# Patient Record
Sex: FEMALE | Race: WHITE
[De-identification: names, ages, dates, MRNs, and addresses within clinical notes are randomized per-mention and may not be internally consistent; named-entity substitution may affect disease eponyms.]

---

## 2018-11-08 ENCOUNTER — HOSPITAL ENCOUNTER (INPATIENT)
Dept: HOSPITAL 88 - ER | Age: 63
LOS: 6 days | Discharge: HOME | DRG: 872 | End: 2018-11-14
Attending: INTERNAL MEDICINE | Admitting: INTERNAL MEDICINE
Payer: COMMERCIAL

## 2018-11-08 VITALS — DIASTOLIC BLOOD PRESSURE: 71 MMHG | SYSTOLIC BLOOD PRESSURE: 158 MMHG

## 2018-11-08 VITALS — SYSTOLIC BLOOD PRESSURE: 158 MMHG | DIASTOLIC BLOOD PRESSURE: 71 MMHG

## 2018-11-08 VITALS — WEIGHT: 262.06 LBS | BODY MASS INDEX: 42.12 KG/M2 | HEIGHT: 66 IN

## 2018-11-08 DIAGNOSIS — B96.1: ICD-10-CM

## 2018-11-08 DIAGNOSIS — F32.9: ICD-10-CM

## 2018-11-08 DIAGNOSIS — I10: ICD-10-CM

## 2018-11-08 DIAGNOSIS — A41.89: Primary | ICD-10-CM

## 2018-11-08 DIAGNOSIS — N39.0: ICD-10-CM

## 2018-11-08 DIAGNOSIS — A04.72: ICD-10-CM

## 2018-11-08 DIAGNOSIS — B96.89: ICD-10-CM

## 2018-11-08 DIAGNOSIS — E87.6: ICD-10-CM

## 2018-11-08 DIAGNOSIS — E11.9: ICD-10-CM

## 2018-11-08 DIAGNOSIS — F41.9: ICD-10-CM

## 2018-11-08 DIAGNOSIS — R65.20: ICD-10-CM

## 2018-11-08 LAB
ALBUMIN SERPL-MCNC: 2.7 G/DL (ref 3.5–5)
ALBUMIN/GLOB SERPL: 0.8 {RATIO} (ref 0.8–2)
ALP SERPL-CCNC: 64 IU/L (ref 40–150)
ALT SERPL-CCNC: 19 IU/L (ref 0–55)
AMYLASE SERPL-CCNC: 16 U/L (ref 25–125)
ANION GAP SERPL CALC-SCNC: 13.9 MMOL/L (ref 8–16)
BASOPHILS # BLD AUTO: 0.1 10*3/UL (ref 0–0.1)
BASOPHILS NFR BLD AUTO: 0.5 % (ref 0–1)
BUN SERPL-MCNC: 6 MG/DL (ref 7–26)
BUN/CREAT SERPL: 7 (ref 6–25)
C DIFFICILE TOXIN A&B AMP PROB: (no result)
CALCIUM SERPL-MCNC: 8.9 MG/DL (ref 8.4–10.2)
CHLORIDE SERPL-SCNC: 95 MMOL/L (ref 98–107)
CO2 SERPL-SCNC: 27 MMOL/L (ref 22–29)
DEPRECATED NEUTROPHILS # BLD AUTO: 10.2 10*3/UL (ref 2.1–6.9)
EGFRCR SERPLBLD CKD-EPI 2021: > 60 ML/MIN (ref 60–?)
EOSINOPHIL # BLD AUTO: 0.1 10*3/UL (ref 0–0.4)
EOSINOPHIL NFR BLD AUTO: 0.8 % (ref 0–6)
ERYTHROCYTE [DISTWIDTH] IN CORD BLOOD: 11.5 % (ref 11.7–14.4)
GLOBULIN PLAS-MCNC: 3.2 G/DL (ref 2.3–3.5)
GLUCOSE SERPLBLD-MCNC: 107 MG/DL (ref 74–118)
HCT VFR BLD AUTO: 36.9 % (ref 34.2–44.1)
HEMOCCULT STL QL: POSITIVE
HGB BLD-MCNC: 12.6 G/DL (ref 12–16)
LIPASE SERPL-CCNC: 8 U/L (ref 8–78)
LYMPHOCYTES # BLD: 1.2 10*3/UL (ref 1–3.2)
LYMPHOCYTES NFR BLD AUTO: 9.6 % (ref 18–39.1)
LYMPHOCYTES NFR BLD MANUAL: 12 % (ref 19–48)
MCH RBC QN AUTO: 32.4 PG (ref 28–32)
MCHC RBC AUTO-ENTMCNC: 34.1 G/DL (ref 31–35)
MCV RBC AUTO: 94.9 FL (ref 81–99)
MONOCYTES # BLD AUTO: 1.2 10*3/UL (ref 0.2–0.8)
MONOCYTES NFR BLD AUTO: 9.4 % (ref 4.4–11.3)
MONOCYTES NFR BLD MANUAL: 5 % (ref 3.4–9)
NEUTS BAND NFR BLD MANUAL: 5 %
NEUTS SEG NFR BLD AUTO: 79.3 % (ref 38.7–80)
NEUTS SEG NFR BLD MANUAL: 77 % (ref 40–74)
PLAT MORPH BLD: NORMAL
PLATELET # BLD AUTO: 289 X10E3/UL (ref 140–360)
PLATELET # BLD EST: ADEQUATE 10*3/UL
POTASSIUM SERPL-SCNC: 2.9 MMOL/L (ref 3.5–5.1)
RBC # BLD AUTO: 3.89 X10E6/UL (ref 3.6–5.1)
RBC MORPH BLD: NORMAL
SODIUM SERPL-SCNC: 133 MMOL/L (ref 136–145)

## 2018-11-08 PROCEDURE — 99284 EMERGENCY DEPT VISIT MOD MDM: CPT

## 2018-11-08 PROCEDURE — 87493 C DIFF AMPLIFIED PROBE: CPT

## 2018-11-08 PROCEDURE — 80053 COMPREHEN METABOLIC PANEL: CPT

## 2018-11-08 PROCEDURE — 82270 OCCULT BLOOD FECES: CPT

## 2018-11-08 PROCEDURE — 87086 URINE CULTURE/COLONY COUNT: CPT

## 2018-11-08 PROCEDURE — 87186 SC STD MICRODIL/AGAR DIL: CPT

## 2018-11-08 PROCEDURE — 87045 FECES CULTURE AEROBIC BACT: CPT

## 2018-11-08 PROCEDURE — 82150 ASSAY OF AMYLASE: CPT

## 2018-11-08 PROCEDURE — 85025 COMPLETE CBC W/AUTO DIFF WBC: CPT

## 2018-11-08 PROCEDURE — 83690 ASSAY OF LIPASE: CPT

## 2018-11-08 PROCEDURE — 81001 URINALYSIS AUTO W/SCOPE: CPT

## 2018-11-08 PROCEDURE — 36415 COLL VENOUS BLD VENIPUNCTURE: CPT

## 2018-11-08 PROCEDURE — 80048 BASIC METABOLIC PNL TOTAL CA: CPT

## 2018-11-08 PROCEDURE — 74177 CT ABD & PELVIS W/CONTRAST: CPT

## 2018-11-08 RX ADMIN — VANCOMYCIN HYDROCHLORIDE SCH MG: 250 CAPSULE ORAL at 17:56

## 2018-11-08 RX ADMIN — POTASSIUM CHLORIDE, DEXTROSE MONOHYDRATE AND SODIUM CHLORIDE SCH MLS/HR: 150; 5; 450 INJECTION, SOLUTION INTRAVENOUS at 17:56

## 2018-11-08 RX ADMIN — CITALOPRAM HYDROBROMIDE SCH MG: 20 TABLET, FILM COATED ORAL at 21:23

## 2018-11-08 RX ADMIN — METRONIDAZOLE SCH MG: 500 INJECTION, SOLUTION INTRAVENOUS at 17:37

## 2018-11-08 NOTE — XMS REPORT
Clinical Summary

                             Created on: 2018



Reta Vallejo

External Reference #: [id number]

: 1955

Sex: Female



Demographics







                          Address                   [phone]

[place]  [address]

 

                          Home Phone                [phone]

 

                          Preferred Language        English

 

                          Marital Status            

 

                          Adventist Affiliation     [id number]

 

                          Race                      White

 

                          Ethnic Group              Non-





Author







                          Author                    [facility]

 

                          Organization              [facility]

 

                          Address                   Unknown

 

                          Phone                     Unavailable







Support







                Name            Relationship    Address         Phone

 

                Huseyin Vallejo    [employer]            Unknown         [phone]







Care Team Providers







                    Care Team Member Name    Role                Phone

 

                    Beverly Fuentes DO    PCP                 [phone]







Allergies

Not on File



Current Medications

Not on file



Active Problems





Not on file



Encounters







    



              Date         Type         Specialty     Care Team     Description

 

    



              2018     Hospital     Radiology     Beverly Fuentes,      Transient arthropathy,





                           Encounter                 pelvic region and thigh,



                                         right

 

    



              2018     [facility]     Radiology     Beverly Fuentes DO     Low back pain,



                           Encounter                 unspecified back pain



                                         laterality, unspecified



                                         chronicity, with sciatica



                                         presence unspecified

 

    



              2018     [facility]     Radiology     Beverly Fuentes DO     Postmenopausal status





                           Encounter                 (age-related) (natural)

 

    



              2018     [facility]     Radiology     Beverly Fuentes DO     Low back pain,





                           Orders                    unspecified back pain



                                         laterality, unspecified



                                         chronicity, with sciatica



                                         presence unspecified



                                         (Primary Dx);



                                         Transient arthropathy,



                                         pelvic region and thigh,



                                         right

 

    



              2018     [facility]     Access       Beverly Fuentes DO     Postmenopausal status





                           Orders                    (age-related) (natural)



                                         (Primary Dx)

 

    



              2018     [facility]     Radiology     Beverly Fuentes DO     Screening breast





                           Encounter                 examination

 

    



              2017     [facility]     Access       Beverly Fuentes DO     Screening breast



                           Orders                    examination (Primary Dx)



after 2017



Social History







    



              Tobacco Use     Types        Packs/Day     Years Used     Date

 

    



                                         Never Assessed    









 



                           Sex Assigned at Birth     Date Recorded

 

 



                                         Not on file 







Last Filed Vital Signs

Not on file



Plan of Treatment







   



                 Health Maintenance     Due Date        Last Done       Comments

 

   



                           CERVICAL CANCER SCREENING     1974  

 

   



                           COLON CANCER SCREENING     2003  

 

   



                           SHINGRIX VACCINE (#1)     2003  

 

   



                           ZOSTER VACCINE            2013  

 

   



                           PNEUMOCOCCAL              2018  



                                         POLYSACCHARIDE VACCINE   



                                         AGE 65 AND OVER   

 

   



                           PNEUMOCOCCAL-13           2018  

 

   



                           INFLUENZA VACCINE         2018  

 

   



                     BREAST CANCER SCREENING     2020 







Procedures







    



              Procedure Name     Priority     Date/Time     Associated Diagnosis     Comments

 

    



              XR HIP 4 VIEWS RIGHT     Routine      2018     Transient arthropathy,     Results 

for this



                     12:26 PM CDT        pelvic region and thigh,     procedure are in the



                           right                     results section.

 

    



              XR LUMBAR SPINE COMPLETE     Routine      2018     Low back pain,     Results for 

this



                 4+ VW           12:25 PM CDT     unspecified back pain     procedure are in the



                           laterality, unspecified     results section.



                                         chronicity, with sciatica 



                                         presence unspecified 

 

    



              BONE DENSITY     Routine      2018     Postmenopausal status     Results for this



                     11:42 AM CDT        (age-related) (natural)     procedure are in the



                                         results section.

 

    



              MAMMO SCREENING W CAD     Routine      2018     Screening breast     Results for this





                 BILATERAL       10:44 AM CST     examination     procedure are in the



                                         results section.



after 2017



Results

* XR Hip 4 Views Right (2018 12:26 PM)





 



                           Narrative                 Performed At

 

 



                           Title:Right hip            [facility]



                                         Reason for exam:M12.851 Other specific arthropathiesnot elsewhere 



                                         classifiedright hip, ARTHRITISHIP 



                                          



                                         Comparison studies: 



                                         CT right hip 



                                         Impression: 



                                         There is a total right hip prosthesis in good alignment. There is no breakage or

 



                                         displacement of the hardware. 



                                         The sacroiliac joints are unremarkable. The iliac, pubic and ischial bones are 





                                         unremarkable. The left hip is incidentally unremarkable. 



                                         [id number] 









                                        Procedure Note

 

                                        



Hm Interface, Radiology Results Incoming - 2018  2:14 PM CDT



Title:Right hip



Reason for exam:M12.851 Other specific arthropathies  not elsewhere classified  
right hip, ARTHRITIS  HIP

   



Comparison studies:



CT right hip



Impression:



There is a total right hip prosthesis in good alignment. There is no breakage or
displacement of the hardware.



The sacroiliac joints are unremarkable. The iliac, pubic and ischial bones are 
unremarkable. The left hip is incidentally unremarkable.







[id number]











   



                 Performing Organization     Address         City/State/Zipcode     Phone Number

 

   



                      [facility]          [address] [place] [address] 





* XR Lumbar Spine Complete 4+ Vw (2018 12:25 PM)





 



                           Narrative                 Performed At

 

 



                           EXAMINATION:XR LUMBAR SPINE COMPLETE 4VW     [facility]



                                         CLINICAL HISTORY:M54.5 Low back pain, LOW BACK PAINUNCOMPLICATEDNO 





                                         RED FLAG SIGNS SYMPTOMS HISTORY 



                                         COMPARISON:None. 



                                         IMPRESSION: 



                                         There is mild congenital lumbar spinal canal stenosis with decrease in the 



                                         transverse diameter of the spinal canal. 



                                         There is mild diffuse disc space narrowing throughout the lumbar spine. 



                                         There is grade 1 degenerative anterolisthesis at L4-5 with prominent underlying

 



                                         facet joint degenerative changes especially on the right. 



                                         There is mild levo rotoscoliosis. 



                                         There is no evidence of compression fracture. 



                                         [facility]-[id number] 









                                        Procedure Note

 

                                        



Hm Interface, Radiology Results Incoming - 2018  2:26 PM CDT



EXAMINATION:  XR LUMBAR SPINE COMPLETE 4  VW



CLINICAL HISTORY:  M54.5 Low back pain, LOW BACK PAIN  UNCOMPLICATED  NO RED 
FLAG SIGNS SYMPTOMS HISTORY



COMPARISON:  None.





IMPRESSION:

 



There is mild congenital lumbar spinal canal stenosis with decrease in the 
transverse diameter of the spinal canal.



There is mild diffuse disc space narrowing throughout the lumbar spine.



There is grade 1 degenerative anterolisthesis at L4-5 with prominent underlying 
facet joint degenerative changes especially on the right.



There is mild levo rotoscoliosis.



There is no evidence of compression fracture.



 



 





[facility]-[id number]











   



                 Performing Organization     Address         City/State/Zipcode     Phone Number

 

   



                     [facility]          [address] [place] [address] 





* Bone Density (2018 11:42 AM)





 



                           Narrative                 Performed At

 

 



                           EXAMINATION:BONE DENSITY      [facility]



                                         CLINICAL HISTORY:Z78.0 Asymptomatic menopausal state, postmenopausal status

 



                                         COMPARISON:None. 



                                         The results of this study expressed as bone mineral density (BMD) were as 



                                         follows: 



                                         AP spine (L1-L4) 



                                         BMD: 1.45 g/cm2 



                                         T-Score: 2.1 



                                         Percent change from previous:NA 



                                         Dual Femur (Total Mean): 



                                         BMD: 0.92 g/cm2 



                                         T-Score: -0.7 



                                         Percent change from previous:NA 



                                         Forearm (Radius 33%): 



                                         BMD: NA g/cm2 



                                         T-Score: NA 



                                         Percent change from previous:NA 



                                         Dual femur FRAX: 



                                         Risk factors: Current tobacco use 



                                         10 year probability of fracture: 



                                         1.Major osteoporotic: 7.9% 



                                         2.Hip: 1.3% 



                                         Impression: 



                                         Bone mineral density values as above. No osteoporosis or osteopenia. 



                                         A copy of this scans including a report detailing these results will follow. 



                                         Note: The world health organization (WHO) has classified the patient's T-score 





                                         as follows: 



                                         Normal:T score at or above -1.0 



                                         Osteopenia:T score between -1.0 and -2.5 



                                         Osteoporosis:T score at or below -2.5 (osteoporosis, increased fracture 



                                         risk) 



                                         [facility]-[id number] 









                                        Procedure Note

 

                                        



 Interface, Radiology Results Incoming - 2018  3:41 PM CDT



EXAMINATION:  BONE DENSITY



CLINICAL HISTORY:  Z78.0 Asymptomatic menopausal state, postmenopausal status



COMPARISON:  None.



The results of this study expressed as bone mineral density (BMD) were as 
follows:



AP spine (L1-L4)

BMD: 1.45 g/cm2

T-Score: 2.1

Percent change from previous:  NA 





Dual Femur (Total Mean):

BMD: 0.92 g/cm2

T-Score: -0.7

Percent change from previous:    NA





Forearm (Radius 33%):

BMD: NA g/cm2

T-Score: NA

Percent change from previous:  NA





Dual femur FRAX:

Risk factors: Current tobacco use

                                        10 year probability of fracture:

                                        1.  Major osteoporotic: 7.9%

                                        2.  Hip: 1.3%







Impression:  

Bone mineral density values as above. No osteoporosis or osteopenia.







A copy of this scans including a report detailing these results will follow.



Note: The world health organization (WHO) has classified the patient's T-score 
as follows:



Normal:  T score at or above -1.0

Osteopenia:  T score between -1.0 and -2.5

Osteoporosis:  T score at or below -2.5 (osteoporosis, increased fracture risk)



[facility]-[id number]











   



                 Performing Organization     Address         City/State/Zipcode     Phone Number

 

   



                      [facility]          [address] [place] [address] 





* Mammo Screening w Cad Bilateral (2018 10:44 AM)





 



                           Narrative                 Performed At

 

 



                           PROCEDURE: MAMMO SCREENING W CAD BILATERAL      [facility]



                                         Computer-assisted detection was utilized inthe interpretation of this exam.

 



                                         COMPARISON: No prior outside facility mammograms from [facility] 



                                         TECHNIQUE: 



                                          



                                         Bilateral digital screening mammogram was performed and interpreted using 



                                         computer-assisted detection. 



                                         HISTORY: Asymptomatic routine screening. 



                                         Family History: No known family history. 



                                         FINDINGS: 



                                         BreastComposition: There are scattered areas of fibroglandular density ( 



                                         category B). 



                                         No suspicious mass , architectural distortion or suspicious microcalcifications

 



                                         are present. 



                                         There are scattered bilateral benign morphology breast calcifications. 



                                         IMPRESSION: No mammographic evidence of malignancy. 



                                         Birads Category 2. Benign. 



                                         RECOMMENDATIONS:If the clinical breast examination is unchanged and normal 

, 



                                         annual screening mammography is recommended per ACS and ACR guidelines. 



                                         PATIENT INFORMATION HAS BEEN ENTERED INTO A REMINDER SYSTEM WITH TARGET DUE DATE

 



                                         FOR THE NEXT MAMMOGRAM. 



                                         NOTE: 



                                         This facility is a designated [facility] ( [facility]) 

, 



                                         meeting standards of accreditation in all modalities of breast imaging. 



                                         This facility is accredited by The American College of Radiology for 



                                         Mammography. 



                                         A negative x-ray report should not delay biopsy if a dominant or clinically 



                                         suspicious mass is present. Not all cancers are identified by x-ray. 



                                         [id number] 









   



                 Performing Organization     Address         City/State/Zipcode     Phone Number

 

   



                      [facility]          [address] [place] [address] 





after 2017



Insurance







     



            Payer      Benefit     Subscriber ID     Type       Phone      Address



                                         Plan /    



                                         Group    

 

     



                 BCBS            BCBS            xxxxxxxxxxxx     PPO  



                                         CHOICE    



                                         PPO/ROHINI HINES PPO    









     



            Guarantor Name     Account     Relation to     Date of     Phone      Billing Address



                     Type                Patient             Birth  

 

     



            RETA VALLEJO     Personal/F     Self       1955     Home:      [address]



                     amily               [phone]     [place] [address]

## 2018-11-08 NOTE — XMS REPORT
Patient Summary Document

                             Created on: 2018



RETA SPARKS

External Reference #: [id number]

: 1955

Sex: Female



Demographics







                          Address                   [address]

[place]  [address]

 

                          Home Phone                [phone]

 

                          Preferred Language        Unknown

 

                          Marital Status            Unknown

 

                          Protestant Affiliation     Unknown

 

                          Race                      Unknown

 

                                        Additional Race(s)  

 

                          Ethnic Group              Unknown





Author







                          Author                    [facility]

 

                          Address                   Unknown

 

                          Phone                     Unavailable







Care Team Providers







                    Care Team Member Name    Role                Phone

 

                    MIRNA JOSEPH    Unavailable         Unavailable







Problems

This patient has no known problems.



Allergies, Adverse Reactions, Alerts

This patient has no known allergies or adverse reactions.



Medications

This patient has no known medications.



Results







           Test Description    Test Time    Test Comments    Text Results    Atomic Results    Result

 Comments

 

                CT ABDOMEN/PELVIS W    2018 15:19:00                                                       

                                                   [place] [address]      Patient Name: RETA SPARKS                                   MR
#: [id number]                     : 1955                               
   Age/Sex: 63/F  Acct #: [id number]                              Req #: [id number]
[id number]  Adm Physician:                                                      
Ordered by: FRANCIS JOSEPH MD                            Report #: [id number]  
     Location: ER                                      Room/Bed:                
    
___________________________________________________________________________________________________
   Procedure: [id number] CT/CT ABDOMEN/PELVIS W  Exam Date: 18             
              Exam Time: 1430                                              
REPORT STATUS: Signed    EXAM: CT Abdomen and Pelvis WITH contrast     INDICATIO
N:          low abd pain profuse diarrhea    2018    143    N          
COMPARISON: None.   TECHNIQUE: Abdomen and pelvis were scanned utilizing a 
multidetector helical   scanner from the lung base to the pubic symphysis after 
administration of IV   contrast. Coronal and sagittal reformations were 
obtained. Routine protocol was   performed. Scan was performed when during 
portal venous phase.               IV CONTRAST: 100 mL of Isovue-370            
  ORAL CONTRAST: Water               RADIATION DOSE: Total DLP: 850.7 mGy*cm    
           Estimated effective dose: (DLP x 0.015 x size factor) mSv            
  COMPLICATIONS: None      FINDINGS:      LINES and TUBES: None.      LOWER 
THORAX:  Small to moderate hiatal hernia.      HEPATOBILIARY: Hepatomegaly. The 
liver measures 24 cm in length in the   craniocaudal dimension.  No focal 
hepatic lesions. No biliary ductal dilation.       GALLBLADDER: No radio-opaque 
stones or sludge.  No wall thickening.      SPLEEN: No splenomegaly.       PANC
REAS: No focal masses or ductal dilatation.        ADRENALS: 1.5 cm 
indeterminate left adrenal gland nodule on series 2, image 23.   The right 
adrenal gland is normal.          KIDNEYS/URETERS: Kidneys enhance 
symmetrically.  No hydronephrosis. No cystic   or solid mass lesions.  No 
stones.      GI TRACT: Diffuse wall thickening of the sigmoid colon and rectum 
with   surrounding fat stranding and hyperemia without diverticulosis. The 
remaining   bowel is unremarkable.       Appendix is normal.      PELVIC 
ORGANS/BLADDER: Unremarkable.      LYMPH NODES: No lymphadenopathy.      
VESSELS: The abdominal aorta and pelvic arteries are normal in caliber and   
associated with mild atherosclerotic calcifications. Bilateral renal stents.   
Patency cannot be evaluated on this exam. Celiac trunk and SMA are patent with  
associated mild nonobstructing atherosclerotic calcifications.      PERITONEUM /
RETROPERITONEUM: No free air or fluid.      BONES: Right hip replacement.      
SOFT TISSUES: Calcified granulomata in the soft tissues of the right thigh   
likely related to prior injection.                     IMPRESSION:    1. Acute 
inflammatory versus infectious proctocolitis. Recommend follow-up CT   abdomen 
and pelvis after treatment is completed.      - No free air or free fluid in the
abdomen and pelvis.      - No diverticulosis.      2. Indeterminate left adrenal
gland nodule (1.5 cm). Recommend ambulatory CT or   MRI abdomen with and without
contrast arteriogram protocol for further   evaluation.      Signed by: Dr. Stephanie Mueller M.D. on 2018 3:36 PM        Dictated By: STEPHANIE MUELLER MD  Electronically Signed By: STEPHANIE MUELLER MD on 
18 1536  Transcribed By: KEYSHAWN on 18 1536       COPY TO:   
FRANCIS JOSEPH MD

## 2018-11-08 NOTE — DIAGNOSTIC IMAGING REPORT
EXAM: CT Abdomen and Pelvis WITH contrast  

INDICATION:      

^low abd pain profuse diarrhea

^[id number]

^1430

^N    



COMPARISON: None.

TECHNIQUE: Abdomen and pelvis were scanned utilizing a multidetector helical

scanner from the lung base to the pubic symphysis after administration of IV

contrast. Coronal and sagittal reformations were obtained. Routine protocol was

performed. Scan was performed when during portal venous phase.

            IV CONTRAST: 100 mL of Isovue-370

            ORAL CONTRAST: Water

            RADIATION DOSE: Total DLP: 850.7 mGy*cm

             Estimated effective dose: (DLP x 0.015 x size factor) mSv

            COMPLICATIONS: None



FINDINGS:



LINES and TUBES: None.



LOWER THORAX:  Small to moderate hiatal hernia.



HEPATOBILIARY: Hepatomegaly. The liver measures 24 cm in length in the

craniocaudal dimension.  No focal hepatic lesions. No biliary ductal dilation. 



GALLBLADDER: No radio-opaque stones or sludge.  No wall thickening.



SPLEEN: No splenomegaly. 



PANCREAS: No focal masses or ductal dilatation.  



ADRENALS: 1.5 cm indeterminate left adrenal gland nodule on series 2, image 23.

The right adrenal gland is normal.    



KIDNEYS/URETERS: Kidneys enhance symmetrically.  No hydronephrosis. No cystic

or solid mass lesions.  No stones.



GI TRACT: Diffuse wall thickening of the sigmoid colon and rectum with

surrounding fat stranding and hyperemia without diverticulosis. The remaining

bowel is unremarkable.       Appendix is normal.



PELVIC ORGANS/BLADDER: Unremarkable.



LYMPH NODES: No lymphadenopathy.



VESSELS: The abdominal aorta and pelvic arteries are normal in caliber and

associated with mild atherosclerotic calcifications. Bilateral renal stents.

Patency cannot be evaluated on this exam. Celiac trunk and SMA are patent with

associated mild nonobstructing atherosclerotic calcifications.



PERITONEUM / RETROPERITONEUM: No free air or fluid.



BONES: Right hip replacement.



SOFT TISSUES: Calcified granulomata in the soft tissues of the right thigh

likely related to prior injection.            





IMPRESSION: 

1. Acute inflammatory versus infectious proctocolitis. Recommend follow-up CT

abdomen and pelvis after treatment is completed.



- No free air or free fluid in the abdomen and pelvis.



- No diverticulosis.



2. Indeterminate left adrenal gland nodule (1.5 cm). Recommend ambulatory CT or

MRI abdomen with and without contrast arteriogram protocol for further

evaluation.



Signed by: Dr. Olga Garnica M.D. on 11/8/2018 3:36 PM

## 2018-11-08 NOTE — XMS REPORT
[facility] Summary

                             Created on: 2018



Ramón Vallejo

External Reference #: [id number]

: 1955

Sex: Female



Demographics







                          Address                   [address]

[place]  [address]

 

                          Home Phone                [email or web address]

 

                          Preferred Language        English

 

                          Marital Status            M

 

                          Synagogue Affiliation     Unknown

 

                          Race                      Unknown

 

                          Ethnic Group              Non-





Author







                          Author                    Admin, [place]

 

                          Organization              [facility]

 

                          Address                   Unknown

 

                          Phone                     Unavailable







Allergies, Adverse Reactions, Alerts







           Allergy Name    Reaction Description    Start Date    Severity    Status     Provider

 

           No Known Allergies                                         Evy Zepeda CMA







Conditions or Problems







        Problem Name    Problem Code    Onset Date    Status    Entry Date    Provider    Comment    Standard

 Description                            Annotate

 

           Hidradenitis suppurativa    705.83         Active         Beverly Omar Pariani

 DO                                     Hidradenitis         

 

           Hx of colon polyps    V12.72         Active         Beverly Omar Pariani 

DO                                      Personal history of colonic polyps    gets colonoscopy q 2 yrs per ptn

 

           Screening for ovarian cancer    V76.46         Active         Beverly Omar

 Pariani DO                             Screening for malignant neoplasms of the ovary     

 

             Screening Pap smear exam for cervical cancer    V76.2            Active       

                Beverly Omar Pariani DO                    Screening for malignant neoplasms of the cervix     

 

          Sebaceous cyst    706.2         Active        Beverly Omar Pariani DO     

                          Sebaceous cyst             

 

          Aortic stenosis    424.1         Active        Beverly Omar Pariani DO    

                          Aortic valve disorders    9/15 Echo: mod AS (SULMA 1.1 cm)

Cards: Dr. Ortega

 

                Asymptomatic postmenopausal status (age-related) (natural)    V49.81                

Active                Beverly Omar Fierroani DO                    Asymptomatic postmenopausal status

 (age-related) (natural)                 

 

        CAD     414.00        Active        Beverly Omar Sriani DO            Coronary

 atherosclerosis of unspecified type of vessel, native or graft    Cards: Dr. Ortega



1/10 CathL 60-70 % dominant LCX stenosis

rec: med mgnt

 

           Diastolic dysfunction    429.9          Active         Beverlyjuanis Fuentes

 DO                                     Heart disease, unspecified     Echo: Grade 2 diastolic dysfn

 

           Hip joint pain, left    719.45         Active         Beverly Fuentes

 DO                                     Pain in joint involving pelvic region and thigh    s/p R hip joint replacement



Ortho: Dr. Figueroa (old)

Ortho: Dr. Ray (new)

 

           Incontinence, mixed, urge/stress    788.33         Active         Beverly Fuentes DO                             Mixed incontinence (female) (male)    wears pads at night

Uro: Dr. Chaudhary

 

          Lower back pain    724.2         Active        Beverly Fuentes DO    

                          Lumbago                   3/18 xray: grade 1 degen anterolisthesis @ L5-L5 w/ promiment underlying

 facet joint degen changes on the R

 

        Obesity                Active        Beverly Nelson Sriester DO            Obesity, 

unspecified                              

 

        PVC     427.69        Active        Beverly Fuentes DO            Other premature

 beats                                  Cards: Dr. Ortega

 

        Anxiety    300.00        Active        Beverly Fuentes             Anxiety

 state, unspecified                     Psychiatry: Dr. Galindo Erwin

3/26/18 GAD7=4 (tx: Citalopram 80 mg, Clonazepam 2mg TID)

 

           Arthritis, right hip    716.95         Active         Beverly Fuentes

                                      Arthropathy unspecified, involving pelvic region and thigh    s/p R hip replacment



regular water aerobics

 

        Depression    311         Active        Beverly Fuentes             Depressive

 disorder, not elsewhere classified     Psychiatry: Dr. Galindo Erwin

3/2018 PHQ 9=5 (tx: Citalopram 80 mg, Clonazepam 2mg TID)

 

          Frequent falls    781.2         Active        Beverly Omar Alfredo MCKENZIE     

                          Abnormality of gait       2/2 R hip pain

 

        Hx of HPV    079.4        Active        Beverly Omar uFentes DO            Human

 papillomavirus infection in conditions classified elsewhere and of unspecified 
site                                    enrolled in a study at [facility]

OB/GYN: Dr. Vicki Pryor

 

           Hx of iron deficiency anemia    V12.3          Active         Beverly Fuentes DO                             Personal history of diseases of blood and blood-forming organs     



 

           Hypercholesterolemia    272.0          Active         Beverly Fuentes

 DO                                     Pure hypercholesterolemia     Low HDL

 

        Hypertension    401.9        Active        Beverly Fuentes DO            

Unspecified essential hypertension      Cardio: Dr. Kaiden Ortega

 

           Preventive health care    V70.0          Active         Beverly Fuentes

 DO                                     Routine general medical examination at a health care facility    refuses vaccines



 

           Screening mammogram for breast cancer    V76.12         Active         Beverly Fuentes DO                        Other screening mammogram     

 

        Tobacco user    305.1        Active        Beverly Fuentes DO            

Tobacco use disorder                    1-1.5 pack/day >30yrs

 

           Stress incontinence    788.39         Inactive        Beverly Fuentes

 DO                                     Other urinary incontinence    wears pads

 

             Female urinary stress incontinence    ICD-625.6        Inactive 

                    Beverly Omar Pariani DO               

 

           Total hip arthroplasty, right    ICD-V43.64        Inactive    Beverly Fierroani DO                         

 

           Female urinary stress incontinence    625.6          Resolved        Beverly Fuentes DO                        Stress incontinence, female    wears pads

 

           Total hip arthroplasty, right    V43.64         Resolved        Beverly Fuentes DO                             Hip joint replaced by other means    Ortho: Dr. Figueroa







Medication List







        Medication    Instructions    Start Date    Stop Date    Generic Name    NDC     Status    Provider

                                        Patient Instruction

 

                          MYRBETRIQ 50 MG ORAL TABLET EXTENDED RELEASE 24 HOUR    take 1 tab By Mouth Every 

at bedtime                   MIRABEGRON    10805286314    Active     Beverly Fuentes DO

                                                    Active

 

                    CENTRUM SILVER 50+WOMEN ORAL TABLET    Take one tab By Mouth Every Day    

                    MULTIPLE VITAMINS-MINERALS    [id number]    Active    Beverly Omar Fuentes DO              

Active

 

             COQ10 200 MG ORAL CAPSULE    Take one capsule By Mouth Every Day                     COENZYME

 Q10         61905920306    Active       Beverly Fuentes DO                 Active

 

                          ALEVE 220 MG ORAL TABLET    Take 2 tabs By Mouth Every 12 hrs As Needed for pain. 

Do not exceed 3 tabs in 24 hrs.                     NAPROXEN SODIUM    56688802528    Active

                    Beverly Omar Fuentes DO                        Active

 

             ASPIRIN 81 MG ORAL TABLET    Take one tab By Mouth Every Day                     ASPIRIN

             [id number]    Active       Beverly Omar Fuentes DO                 Active

 

                          CALCIUM 500-125 MG-UNIT ORAL TABLET    Take one tab By Mouth Every Day for bone health

                          CALCIUM CARBONATE-VITAMIN D    10584806923    Active     Beverly Omar Alfredo

 DO                                                 Active

 

                          FERROUS SULFATE 325 (65 FE) MG ORAL TABLET    Take one tab By Mouth Every Day for 

iron deficiency                   FERROUS SULFATE    77511774299    Active     Beverly Fierroester DO                                         Active

 

                          GLUCOSAMINE CHONDROITIN JOINT ORAL TABLET    Take one tab By Mouth Every Day for your

 joints (hip)                     GLUCOS-CHONDROIT-HYALURON-MSM    25144132637    Active 

                    Beverly Fierroester DO                        Active

 

                    VITAMIN B-12 500 MCG ORAL TABLET    Take one tab By Mouth Every Day for energy    

                    CYANOCOBALAMIN    79679264255    Active    Beverly Fierroester DO              Active

 

                    CITALOPRAM HYDROBROMIDE 40 MG ORAL TABLET    TK 2 TS PO QHS for depression    

                    CITALOPRAM HYDROBROMIDE    37138829366    Active    Beverly Fierroester DO              Active



 

             CLONAZEPAM 2 MG ORAL TABLET    TK 1/2 TO 1 T PO TID PRF ANXIETY                     CLONAZEPAM

             13260807037    Active       Beverly Fierroester DO                 Active

 

                    RAMIPRIL 10 MG ORAL CAPSULE    TK 1 C PO Every Day for blood pressure     

                    RAMIPRIL    68953213298    Active    Beverly Fierroester DO              Active

 

                          METOPROLOL SUCCINATE ER 25 MG ORAL TABLET EXTENDED RELEASE 24 HOUR    Take one tab

 By Mouth Every Day at bedtime for blood pressure                                  METOPROLOL SUCCINATE

             54667056475    Active       Beverly Fierroester DO                 Active

 

                          ATORVASTATIN CALCIUM 10 MG ORAL TABLET    Take one tab By Mouth Every Day at bedtime

 for Cholesterol                   ATORVASTATIN CALCIUM    13446461874    Active     Beverly Fierroester DO                                   Active







Vital Signs







           Date       Name       Value      Unit       Range      Description

 

               blood pressure, diastolic    77         mm[Hg]                BP zarate

 

               blood pressure, systolic    138        mm[Hg]                BP sys

 

               height E&M    65.50      [in_us]               Bdy height

 

               pulse rate E&M    53         /min                  Heart rate

 

               respiratory rate E&M    17         /min                  Resp rate

 

               temperature E&M    98.6       [degF]                Body temperature

 

               weight E&M    240.40     [lb_av]               Weight Measured

 

               blood pressure, diastolic    73         mm[Hg]                BP zarate

 

               blood pressure, systolic    135        mm[Hg]                BP sys

 

               height E&M    65.50      [in_us]               Bdy height

 

               pulse rate E&M    68         /min                  Heart rate

 

               respiratory rate E&M    18         /min                  Resp rate

 

               temperature E&M    98.1       [degF]                Body temperature

 

               weight E&M    237.40     [lb_av]               Weight Measured

 

               blood pressure, diastolic    73         mm[Hg]                BP zarate

 

               blood pressure, systolic    116        mm[Hg]                BP sys

 

               height E&M    65.50      [in_us]               Bdy height

 

               pulse rate E&M    55         /min                  Heart rate

 

               respiratory rate E&M    18         /min                  Resp rate

 

               temperature E&M    98.9       [degF]                Body temperature

 

               weight E&M    233.80     [lb_av]               Weight Measured

 

               blood pressure, diastolic    74         mm[Hg]                BP zarate

 

               blood pressure, systolic    120        mm[Hg]                BP sys

 

               height E&M    65.5       [in_us]               Bdy height

 

               pulse rate E&M    66         /min                  Heart rate

 

               respiratory rate E&M    19         /min                  Resp rate

 

               temperature E&M    98.3       [degF]                Body temperature

 

               weight E&M    234        [lb_av]               Weight Measured







Diagnostic Results







           Date       Name       Value      Unit       Range      Description

 

                                        Office Visit: established - Urinalysis 

 

               nitrite, urine, semiquantitative    negative                           

 

               urobilinogen, urine, semiquantitative (dipstick)    0.2                               

 

               specific gravity, urine    1.010                             

 

               pH, urine, semiquantitative    5.0                               

 

               bilirubin, urine    negative                           

 

                                        Office Visit: Acute Visit - Lab 

 

               Human Papillomavirus test result    Positive                           

 

                                        Office Visit: established - Urinalysis 

 

               leukocyte esterase, urine, by dipstick    negative                           

 

               appearance, urine    clear                             

 

               protein, urine, semiquantitative (dipstick)    negative                           

 

               blood in urine (hemoglobin) by dipstick    negative                           

 

               glucose, urine, semiquantitative    negative                           

 

               urine color    yellow                            

 

               ketones, urine, by test strip    negative                           







Encounters







             Date         Encounter    Provider     Code         Facility

 

                 11:33:40 CDT    Est Patient Detailed - 97242    Beverly Fuentes DO    CPT-[id number]

                                        [facility]

 

                 11:54:41 CDT    Est Patient Detailed - 70491    Beverly Fierroani DO    CPT-[id number]

                                        [facility]

 

                     22:48:29 CST    New Patient Comprehensive - 31889    Beverly Omar Fierroani 

DO                        CPT-[id number]                 [facility]

 

                 22:37:14 CST    Est Patient Detailed - 50888    Beverly Omarjazzy Fierroani DO    CPT-68851

                                        [facility]







Procedures







             Code         Procedure Name    Date         Entry Date    Standard Description

 

                CPT-62101       Est Patient Well Exam (65 & Over) - 65346     20:03:31 CDT    

                                         

 

                CPT-83879       Urinalysis - Dip only - In House     18:21:07 CDT    

                                         

 

                CPT-46064       Urinalysis - Dip only - In House     00:32:23 CDT

## 2018-11-09 VITALS — DIASTOLIC BLOOD PRESSURE: 67 MMHG | SYSTOLIC BLOOD PRESSURE: 125 MMHG

## 2018-11-09 VITALS — DIASTOLIC BLOOD PRESSURE: 59 MMHG | SYSTOLIC BLOOD PRESSURE: 130 MMHG

## 2018-11-09 VITALS — DIASTOLIC BLOOD PRESSURE: 64 MMHG | SYSTOLIC BLOOD PRESSURE: 139 MMHG

## 2018-11-09 VITALS — DIASTOLIC BLOOD PRESSURE: 65 MMHG | SYSTOLIC BLOOD PRESSURE: 139 MMHG

## 2018-11-09 VITALS — DIASTOLIC BLOOD PRESSURE: 75 MMHG | SYSTOLIC BLOOD PRESSURE: 165 MMHG

## 2018-11-09 VITALS — DIASTOLIC BLOOD PRESSURE: 67 MMHG | SYSTOLIC BLOOD PRESSURE: 149 MMHG

## 2018-11-09 VITALS — SYSTOLIC BLOOD PRESSURE: 126 MMHG | DIASTOLIC BLOOD PRESSURE: 57 MMHG

## 2018-11-09 VITALS — DIASTOLIC BLOOD PRESSURE: 57 MMHG | SYSTOLIC BLOOD PRESSURE: 126 MMHG

## 2018-11-09 LAB
ANION GAP SERPL CALC-SCNC: 15.9 MMOL/L (ref 8–16)
ANISOCYTOSIS BLD QL SMEAR: SLIGHT
BACTERIA URNS QL MICRO: (no result) /HPF
BASOPHILS # BLD AUTO: 0.1 10*3/UL (ref 0–0.1)
BASOPHILS NFR BLD AUTO: 0.5 % (ref 0–1)
BILIRUB UR QL: NEGATIVE
BUN SERPL-MCNC: < 5 MG/DL (ref 7–26)
BUN/CREAT SERPL: 7 (ref 6–25)
CALCIUM SERPL-MCNC: 8.7 MG/DL (ref 8.4–10.2)
CHLORIDE SERPL-SCNC: 99 MMOL/L (ref 98–107)
CLARITY UR: CLEAR
CO2 SERPL-SCNC: 22 MMOL/L (ref 22–29)
COLOR UR: YELLOW
DEPRECATED NEUTROPHILS # BLD AUTO: 10.1 10*3/UL (ref 2.1–6.9)
DEPRECATED RBC URNS MANUAL-ACNC: (no result) /HPF (ref 0–5)
EGFRCR SERPLBLD CKD-EPI 2021: > 60 ML/MIN (ref 60–?)
EOSINOPHIL # BLD AUTO: 0.2 10*3/UL (ref 0–0.4)
EOSINOPHIL NFR BLD AUTO: 1.8 % (ref 0–6)
EOSINOPHIL NFR BLD MANUAL: 3 % (ref 0–7)
EPI CELLS URNS QL MICRO: (no result) /LPF
ERYTHROCYTE [DISTWIDTH] IN CORD BLOOD: 11.4 % (ref 11.7–14.4)
GLUCOSE SERPLBLD-MCNC: 155 MG/DL (ref 74–118)
HCT VFR BLD AUTO: 35 % (ref 34.2–44.1)
HGB BLD-MCNC: 12 G/DL (ref 12–16)
HYPOCHROMIA BLD QL SMEAR: SLIGHT
KETONES UR QL STRIP.AUTO: NEGATIVE
LEUKOCYTE ESTERASE UR QL STRIP.AUTO: NEGATIVE
LYMPHOCYTES # BLD: 1 10*3/UL (ref 1–3.2)
LYMPHOCYTES NFR BLD AUTO: 8.2 % (ref 18–39.1)
LYMPHOCYTES NFR BLD MANUAL: 7 % (ref 19–48)
MCH RBC QN AUTO: 31.9 PG (ref 28–32)
MCHC RBC AUTO-ENTMCNC: 34.3 G/DL (ref 31–35)
MCV RBC AUTO: 93.1 FL (ref 81–99)
MONOCYTES # BLD AUTO: 1.3 10*3/UL (ref 0.2–0.8)
MONOCYTES NFR BLD AUTO: 10.2 % (ref 4.4–11.3)
MONOCYTES NFR BLD MANUAL: 8 % (ref 3.4–9)
NEUTS SEG NFR BLD AUTO: 78.8 % (ref 38.7–80)
NEUTS SEG NFR BLD MANUAL: 80 % (ref 40–74)
NITRITE UR QL STRIP.AUTO: NEGATIVE
PLAT MORPH BLD: NORMAL
PLATELET # BLD AUTO: 268 X10E3/UL (ref 140–360)
PLATELET # BLD EST: ADEQUATE 10*3/UL
POTASSIUM SERPL-SCNC: 2.9 MMOL/L (ref 3.5–5.1)
PROT UR QL STRIP.AUTO: NEGATIVE
RBC # BLD AUTO: 3.76 X10E6/UL (ref 3.6–5.1)
RBC MORPH BLD: NORMAL
SODIUM SERPL-SCNC: 134 MMOL/L (ref 136–145)
SP GR UR STRIP: 1.01 (ref 1.01–1.02)
UROBILINOGEN UR STRIP-MCNC: 0.2 MG/DL (ref 0.2–1)
WBC #/AREA URNS HPF: (no result) /HPF (ref 0–5)

## 2018-11-09 RX ADMIN — POTASSIUM CHLORIDE SCH MEQ: 1500 TABLET, EXTENDED RELEASE ORAL at 15:45

## 2018-11-09 RX ADMIN — CLONAZEPAM SCH MG: 1 TABLET ORAL at 20:35

## 2018-11-09 RX ADMIN — VANCOMYCIN HYDROCHLORIDE SCH MG: 250 CAPSULE ORAL at 12:20

## 2018-11-09 RX ADMIN — METRONIDAZOLE SCH MG: 500 INJECTION, SOLUTION INTRAVENOUS at 01:08

## 2018-11-09 RX ADMIN — POTASSIUM CHLORIDE SCH MEQ: 1500 TABLET, EXTENDED RELEASE ORAL at 17:18

## 2018-11-09 RX ADMIN — METRONIDAZOLE SCH MG: 500 INJECTION, SOLUTION INTRAVENOUS at 23:46

## 2018-11-09 RX ADMIN — VANCOMYCIN HYDROCHLORIDE SCH MG: 250 CAPSULE ORAL at 17:34

## 2018-11-09 RX ADMIN — CITALOPRAM HYDROBROMIDE SCH MG: 20 TABLET, FILM COATED ORAL at 08:58

## 2018-11-09 RX ADMIN — METRONIDAZOLE SCH MG: 500 INJECTION, SOLUTION INTRAVENOUS at 05:21

## 2018-11-09 RX ADMIN — POTASSIUM CHLORIDE SCH MEQ: 1500 TABLET, EXTENDED RELEASE ORAL at 12:18

## 2018-11-09 RX ADMIN — VANCOMYCIN HYDROCHLORIDE SCH MG: 250 CAPSULE ORAL at 01:08

## 2018-11-09 RX ADMIN — METRONIDAZOLE SCH MG: 500 INJECTION, SOLUTION INTRAVENOUS at 17:34

## 2018-11-09 RX ADMIN — Medication PRN MG: at 20:47

## 2018-11-09 RX ADMIN — METOPROLOL TARTRATE SCH MG: 25 TABLET, FILM COATED ORAL at 20:35

## 2018-11-09 RX ADMIN — NICOTINE SCH MG: 21 PATCH, EXTENDED RELEASE TRANSDERMAL at 08:58

## 2018-11-09 RX ADMIN — VANCOMYCIN HYDROCHLORIDE SCH MG: 250 CAPSULE ORAL at 05:21

## 2018-11-09 RX ADMIN — POTASSIUM CHLORIDE SCH MEQ: 1500 TABLET, EXTENDED RELEASE ORAL at 16:30

## 2018-11-09 RX ADMIN — METRONIDAZOLE SCH MG: 500 INJECTION, SOLUTION INTRAVENOUS at 12:20

## 2018-11-09 RX ADMIN — CITALOPRAM HYDROBROMIDE SCH MG: 20 TABLET, FILM COATED ORAL at 20:35

## 2018-11-09 RX ADMIN — VANCOMYCIN HYDROCHLORIDE SCH MG: 250 CAPSULE ORAL at 23:46

## 2018-11-09 RX ADMIN — POTASSIUM CHLORIDE, DEXTROSE MONOHYDRATE AND SODIUM CHLORIDE SCH MLS/HR: 150; 5; 450 INJECTION, SOLUTION INTRAVENOUS at 12:39

## 2018-11-09 RX ADMIN — SODIUM CHLORIDE PRN MG: 900 INJECTION INTRAVENOUS at 20:47

## 2018-11-10 VITALS — SYSTOLIC BLOOD PRESSURE: 113 MMHG | DIASTOLIC BLOOD PRESSURE: 56 MMHG

## 2018-11-10 VITALS — DIASTOLIC BLOOD PRESSURE: 51 MMHG | SYSTOLIC BLOOD PRESSURE: 105 MMHG

## 2018-11-10 VITALS — SYSTOLIC BLOOD PRESSURE: 99 MMHG | DIASTOLIC BLOOD PRESSURE: 52 MMHG

## 2018-11-10 VITALS — SYSTOLIC BLOOD PRESSURE: 125 MMHG | DIASTOLIC BLOOD PRESSURE: 59 MMHG

## 2018-11-10 VITALS — SYSTOLIC BLOOD PRESSURE: 110 MMHG | DIASTOLIC BLOOD PRESSURE: 59 MMHG

## 2018-11-10 VITALS — SYSTOLIC BLOOD PRESSURE: 100 MMHG | DIASTOLIC BLOOD PRESSURE: 52 MMHG

## 2018-11-10 LAB
ANION GAP SERPL CALC-SCNC: 13.8 MMOL/L (ref 8–16)
BUN SERPL-MCNC: < 5 MG/DL (ref 7–26)
BUN/CREAT SERPL: 6 (ref 6–25)
CALCIUM SERPL-MCNC: 8.7 MG/DL (ref 8.4–10.2)
CHLORIDE SERPL-SCNC: 105 MMOL/L (ref 98–107)
CO2 SERPL-SCNC: 26 MMOL/L (ref 22–29)
EGFRCR SERPLBLD CKD-EPI 2021: > 60 ML/MIN (ref 60–?)
GLUCOSE SERPLBLD-MCNC: 108 MG/DL (ref 74–118)
POTASSIUM SERPL-SCNC: 3.8 MMOL/L (ref 3.5–5.1)
SODIUM SERPL-SCNC: 141 MMOL/L (ref 136–145)

## 2018-11-10 RX ADMIN — VANCOMYCIN HYDROCHLORIDE SCH MG: 250 CAPSULE ORAL at 11:34

## 2018-11-10 RX ADMIN — ASPIRIN SCH MG: 81 TABLET, COATED ORAL at 20:48

## 2018-11-10 RX ADMIN — Medication SCH MG: at 09:12

## 2018-11-10 RX ADMIN — CITALOPRAM HYDROBROMIDE SCH MG: 20 TABLET, FILM COATED ORAL at 09:00

## 2018-11-10 RX ADMIN — RAMIPRIL SCH MG: 5 CAPSULE ORAL at 20:48

## 2018-11-10 RX ADMIN — METRONIDAZOLE SCH MG: 500 INJECTION, SOLUTION INTRAVENOUS at 05:55

## 2018-11-10 RX ADMIN — ATORVASTATIN CALCIUM SCH MG: 10 TABLET, FILM COATED ORAL at 20:48

## 2018-11-10 RX ADMIN — Medication PRN MG: at 22:11

## 2018-11-10 RX ADMIN — CLONAZEPAM SCH MG: 1 TABLET ORAL at 20:48

## 2018-11-10 RX ADMIN — VANCOMYCIN HYDROCHLORIDE SCH MG: 250 CAPSULE ORAL at 18:20

## 2018-11-10 RX ADMIN — METRONIDAZOLE SCH MG: 500 INJECTION, SOLUTION INTRAVENOUS at 18:20

## 2018-11-10 RX ADMIN — VANCOMYCIN HYDROCHLORIDE SCH MG: 250 CAPSULE ORAL at 05:55

## 2018-11-10 RX ADMIN — LOPERAMIDE HYDROCHLORIDE PRN MG: 2 CAPSULE ORAL at 20:50

## 2018-11-10 RX ADMIN — POTASSIUM CHLORIDE SCH MEQ: 1500 TABLET, EXTENDED RELEASE ORAL at 17:00

## 2018-11-10 RX ADMIN — POTASSIUM CHLORIDE, DEXTROSE MONOHYDRATE AND SODIUM CHLORIDE SCH MLS/HR: 150; 5; 450 INJECTION, SOLUTION INTRAVENOUS at 10:08

## 2018-11-10 RX ADMIN — POTASSIUM CHLORIDE, DEXTROSE MONOHYDRATE AND SODIUM CHLORIDE SCH MLS/HR: 150; 5; 450 INJECTION, SOLUTION INTRAVENOUS at 02:59

## 2018-11-10 RX ADMIN — METRONIDAZOLE SCH MG: 500 INJECTION, SOLUTION INTRAVENOUS at 11:34

## 2018-11-10 RX ADMIN — CITALOPRAM HYDROBROMIDE SCH MG: 20 TABLET, FILM COATED ORAL at 20:48

## 2018-11-10 RX ADMIN — METOPROLOL TARTRATE SCH MG: 25 TABLET, FILM COATED ORAL at 20:49

## 2018-11-10 RX ADMIN — NICOTINE SCH MG: 21 PATCH, EXTENDED RELEASE TRANSDERMAL at 10:08

## 2018-11-10 RX ADMIN — SODIUM CHLORIDE SCH GM: 9 INJECTION, SOLUTION INTRAVENOUS at 15:30

## 2018-11-11 VITALS — DIASTOLIC BLOOD PRESSURE: 62 MMHG | SYSTOLIC BLOOD PRESSURE: 119 MMHG

## 2018-11-11 VITALS — DIASTOLIC BLOOD PRESSURE: 54 MMHG | SYSTOLIC BLOOD PRESSURE: 94 MMHG

## 2018-11-11 VITALS — SYSTOLIC BLOOD PRESSURE: 115 MMHG | DIASTOLIC BLOOD PRESSURE: 58 MMHG

## 2018-11-11 VITALS — SYSTOLIC BLOOD PRESSURE: 114 MMHG | DIASTOLIC BLOOD PRESSURE: 63 MMHG

## 2018-11-11 VITALS — SYSTOLIC BLOOD PRESSURE: 124 MMHG | DIASTOLIC BLOOD PRESSURE: 58 MMHG

## 2018-11-11 VITALS — DIASTOLIC BLOOD PRESSURE: 58 MMHG | SYSTOLIC BLOOD PRESSURE: 108 MMHG

## 2018-11-11 VITALS — DIASTOLIC BLOOD PRESSURE: 63 MMHG | SYSTOLIC BLOOD PRESSURE: 114 MMHG

## 2018-11-11 LAB
ALBUMIN SERPL-MCNC: 2.7 G/DL (ref 3.5–5)
ALBUMIN/GLOB SERPL: 0.8 {RATIO} (ref 0.8–2)
ALP SERPL-CCNC: 53 IU/L (ref 40–150)
ALT SERPL-CCNC: 19 IU/L (ref 0–55)
ANION GAP SERPL CALC-SCNC: 14.1 MMOL/L (ref 8–16)
BASOPHILS # BLD AUTO: 0.1 10*3/UL (ref 0–0.1)
BASOPHILS NFR BLD AUTO: 0.6 % (ref 0–1)
BUN SERPL-MCNC: < 5 MG/DL (ref 7–26)
BUN/CREAT SERPL: 6 (ref 6–25)
CALCIUM SERPL-MCNC: 8.8 MG/DL (ref 8.4–10.2)
CHLORIDE SERPL-SCNC: 104 MMOL/L (ref 98–107)
CO2 SERPL-SCNC: 24 MMOL/L (ref 22–29)
DEPRECATED NEUTROPHILS # BLD AUTO: 7.3 10*3/UL (ref 2.1–6.9)
EGFRCR SERPLBLD CKD-EPI 2021: > 60 ML/MIN (ref 60–?)
EOSINOPHIL # BLD AUTO: 0.3 10*3/UL (ref 0–0.4)
EOSINOPHIL NFR BLD AUTO: 3 % (ref 0–6)
ERYTHROCYTE [DISTWIDTH] IN CORD BLOOD: 11.7 % (ref 11.7–14.4)
GLOBULIN PLAS-MCNC: 3.3 G/DL (ref 2.3–3.5)
GLUCOSE SERPLBLD-MCNC: 95 MG/DL (ref 74–118)
HCT VFR BLD AUTO: 37.6 % (ref 34.2–44.1)
HGB BLD-MCNC: 12.4 G/DL (ref 12–16)
LYMPHOCYTES # BLD: 1.8 10*3/UL (ref 1–3.2)
LYMPHOCYTES NFR BLD AUTO: 17.3 % (ref 18–39.1)
MCH RBC QN AUTO: 32.1 PG (ref 28–32)
MCHC RBC AUTO-ENTMCNC: 33 G/DL (ref 31–35)
MCV RBC AUTO: 97.4 FL (ref 81–99)
MONOCYTES # BLD AUTO: 0.8 10*3/UL (ref 0.2–0.8)
MONOCYTES NFR BLD AUTO: 7.9 % (ref 4.4–11.3)
NEUTS SEG NFR BLD AUTO: 70.4 % (ref 38.7–80)
PLATELET # BLD AUTO: 376 X10E3/UL (ref 140–360)
POTASSIUM SERPL-SCNC: 3.1 MMOL/L (ref 3.5–5.1)
RBC # BLD AUTO: 3.86 X10E6/UL (ref 3.6–5.1)
SODIUM SERPL-SCNC: 139 MMOL/L (ref 136–145)

## 2018-11-11 RX ADMIN — VANCOMYCIN HYDROCHLORIDE SCH MG: 250 CAPSULE ORAL at 12:02

## 2018-11-11 RX ADMIN — NICOTINE SCH MG: 21 PATCH, EXTENDED RELEASE TRANSDERMAL at 08:42

## 2018-11-11 RX ADMIN — METRONIDAZOLE SCH MG: 500 INJECTION, SOLUTION INTRAVENOUS at 05:28

## 2018-11-11 RX ADMIN — ATORVASTATIN CALCIUM SCH MG: 10 TABLET, FILM COATED ORAL at 21:00

## 2018-11-11 RX ADMIN — DICYCLOMINE HYDROCHLORIDE SCH MG: 10 CAPSULE ORAL at 22:16

## 2018-11-11 RX ADMIN — METRONIDAZOLE SCH MG: 500 INJECTION, SOLUTION INTRAVENOUS at 18:16

## 2018-11-11 RX ADMIN — METRONIDAZOLE SCH MG: 500 INJECTION, SOLUTION INTRAVENOUS at 00:20

## 2018-11-11 RX ADMIN — CITALOPRAM HYDROBROMIDE SCH MG: 20 TABLET, FILM COATED ORAL at 21:00

## 2018-11-11 RX ADMIN — POTASSIUM CHLORIDE SCH MEQ: 1500 TABLET, EXTENDED RELEASE ORAL at 18:10

## 2018-11-11 RX ADMIN — VANCOMYCIN HYDROCHLORIDE SCH MG: 250 CAPSULE ORAL at 18:16

## 2018-11-11 RX ADMIN — DICYCLOMINE HYDROCHLORIDE SCH MG: 10 CAPSULE ORAL at 08:40

## 2018-11-11 RX ADMIN — ASPIRIN SCH MG: 81 TABLET, COATED ORAL at 21:00

## 2018-11-11 RX ADMIN — DICYCLOMINE HYDROCHLORIDE SCH MG: 10 CAPSULE ORAL at 15:30

## 2018-11-11 RX ADMIN — RAMIPRIL SCH MG: 5 CAPSULE ORAL at 21:00

## 2018-11-11 RX ADMIN — Medication SCH MG: at 08:40

## 2018-11-11 RX ADMIN — LOPERAMIDE HYDROCHLORIDE PRN MG: 2 CAPSULE ORAL at 09:30

## 2018-11-11 RX ADMIN — CITALOPRAM HYDROBROMIDE SCH MG: 20 TABLET, FILM COATED ORAL at 09:00

## 2018-11-11 RX ADMIN — VANCOMYCIN HYDROCHLORIDE SCH MG: 250 CAPSULE ORAL at 23:51

## 2018-11-11 RX ADMIN — METRONIDAZOLE SCH MG: 500 INJECTION, SOLUTION INTRAVENOUS at 23:51

## 2018-11-11 RX ADMIN — METRONIDAZOLE SCH MG: 500 INJECTION, SOLUTION INTRAVENOUS at 12:02

## 2018-11-11 RX ADMIN — METOPROLOL TARTRATE SCH MG: 25 TABLET, FILM COATED ORAL at 21:00

## 2018-11-11 RX ADMIN — SODIUM CHLORIDE SCH GM: 9 INJECTION, SOLUTION INTRAVENOUS at 15:40

## 2018-11-11 RX ADMIN — VANCOMYCIN HYDROCHLORIDE SCH MG: 250 CAPSULE ORAL at 00:20

## 2018-11-11 RX ADMIN — SODIUM CHLORIDE SCH GM: 9 INJECTION, SOLUTION INTRAVENOUS at 03:48

## 2018-11-11 RX ADMIN — CLONAZEPAM SCH MG: 1 TABLET ORAL at 21:00

## 2018-11-11 RX ADMIN — VANCOMYCIN HYDROCHLORIDE SCH MG: 250 CAPSULE ORAL at 05:28

## 2018-11-12 VITALS — SYSTOLIC BLOOD PRESSURE: 110 MMHG | DIASTOLIC BLOOD PRESSURE: 51 MMHG

## 2018-11-12 VITALS — SYSTOLIC BLOOD PRESSURE: 108 MMHG | DIASTOLIC BLOOD PRESSURE: 61 MMHG

## 2018-11-12 VITALS — SYSTOLIC BLOOD PRESSURE: 115 MMHG | DIASTOLIC BLOOD PRESSURE: 59 MMHG

## 2018-11-12 VITALS — DIASTOLIC BLOOD PRESSURE: 54 MMHG | SYSTOLIC BLOOD PRESSURE: 100 MMHG

## 2018-11-12 VITALS — DIASTOLIC BLOOD PRESSURE: 55 MMHG | SYSTOLIC BLOOD PRESSURE: 111 MMHG

## 2018-11-12 VITALS — DIASTOLIC BLOOD PRESSURE: 57 MMHG | SYSTOLIC BLOOD PRESSURE: 109 MMHG

## 2018-11-12 LAB
ALBUMIN SERPL-MCNC: 2.4 G/DL (ref 3.5–5)
ALBUMIN/GLOB SERPL: 0.9 {RATIO} (ref 0.8–2)
ALP SERPL-CCNC: 52 IU/L (ref 40–150)
ALT SERPL-CCNC: 22 IU/L (ref 0–55)
ANION GAP SERPL CALC-SCNC: 7.7 MMOL/L (ref 8–16)
BASOPHILS # BLD AUTO: 0 10*3/UL (ref 0–0.1)
BASOPHILS NFR BLD AUTO: 0.4 % (ref 0–1)
BUN SERPL-MCNC: 5 MG/DL (ref 7–26)
BUN/CREAT SERPL: 6 (ref 6–25)
CALCIUM SERPL-MCNC: 7.8 MG/DL (ref 8.4–10.2)
CHLORIDE SERPL-SCNC: 105 MMOL/L (ref 98–107)
CO2 SERPL-SCNC: 30 MMOL/L (ref 22–29)
DEPRECATED NEUTROPHILS # BLD AUTO: 4.8 10*3/UL (ref 2.1–6.9)
EGFRCR SERPLBLD CKD-EPI 2021: > 60 ML/MIN (ref 60–?)
EOSINOPHIL # BLD AUTO: 0.3 10*3/UL (ref 0–0.4)
EOSINOPHIL NFR BLD AUTO: 3.5 % (ref 0–6)
EOSINOPHIL NFR BLD MANUAL: 3 % (ref 0–7)
ERYTHROCYTE [DISTWIDTH] IN CORD BLOOD: 11.6 % (ref 11.7–14.4)
GLOBULIN PLAS-MCNC: 2.8 G/DL (ref 2.3–3.5)
GLUCOSE SERPLBLD-MCNC: 91 MG/DL (ref 74–118)
HCT VFR BLD AUTO: 35.3 % (ref 34.2–44.1)
HGB BLD-MCNC: 11.6 G/DL (ref 12–16)
LYMPHOCYTES # BLD: 1.5 10*3/UL (ref 1–3.2)
LYMPHOCYTES NFR BLD AUTO: 20.6 % (ref 18–39.1)
LYMPHOCYTES NFR BLD MANUAL: 21 % (ref 19–48)
MCH RBC QN AUTO: 32.5 PG (ref 28–32)
MCHC RBC AUTO-ENTMCNC: 32.9 G/DL (ref 31–35)
MCV RBC AUTO: 98.9 FL (ref 81–99)
MONOCYTES # BLD AUTO: 0.7 10*3/UL (ref 0.2–0.8)
MONOCYTES NFR BLD AUTO: 9.3 % (ref 4.4–11.3)
MONOCYTES NFR BLD MANUAL: 10 % (ref 3.4–9)
NEUTS SEG NFR BLD AUTO: 63.9 % (ref 38.7–80)
NEUTS SEG NFR BLD MANUAL: 65 % (ref 40–74)
PLAT MORPH BLD: NORMAL
PLATELET # BLD AUTO: 322 X10E3/UL (ref 140–360)
PLATELET # BLD EST: ADEQUATE 10*3/UL
POTASSIUM SERPL-SCNC: 3.7 MMOL/L (ref 3.5–5.1)
RBC # BLD AUTO: 3.57 X10E6/UL (ref 3.6–5.1)
RBC MORPH BLD: NORMAL
SODIUM SERPL-SCNC: 139 MMOL/L (ref 136–145)

## 2018-11-12 RX ADMIN — METOPROLOL TARTRATE SCH MG: 25 TABLET, FILM COATED ORAL at 20:27

## 2018-11-12 RX ADMIN — CLONAZEPAM SCH MG: 1 TABLET ORAL at 20:26

## 2018-11-12 RX ADMIN — CITALOPRAM HYDROBROMIDE SCH MG: 20 TABLET, FILM COATED ORAL at 20:26

## 2018-11-12 RX ADMIN — DICYCLOMINE HYDROCHLORIDE SCH MG: 20 TABLET ORAL at 09:29

## 2018-11-12 RX ADMIN — CITALOPRAM HYDROBROMIDE SCH MG: 20 TABLET, FILM COATED ORAL at 09:00

## 2018-11-12 RX ADMIN — DICYCLOMINE HYDROCHLORIDE SCH MG: 20 TABLET ORAL at 13:11

## 2018-11-12 RX ADMIN — VANCOMYCIN HYDROCHLORIDE SCH MG: 250 CAPSULE ORAL at 18:25

## 2018-11-12 RX ADMIN — VANCOMYCIN HYDROCHLORIDE SCH MG: 250 CAPSULE ORAL at 06:26

## 2018-11-12 RX ADMIN — DICYCLOMINE HYDROCHLORIDE SCH MG: 20 TABLET ORAL at 18:25

## 2018-11-12 RX ADMIN — METRONIDAZOLE SCH MG: 500 INJECTION, SOLUTION INTRAVENOUS at 18:25

## 2018-11-12 RX ADMIN — METRONIDAZOLE SCH MG: 500 INJECTION, SOLUTION INTRAVENOUS at 12:18

## 2018-11-12 RX ADMIN — METRONIDAZOLE SCH MG: 500 INJECTION, SOLUTION INTRAVENOUS at 23:56

## 2018-11-12 RX ADMIN — SODIUM CHLORIDE SCH GM: 9 INJECTION, SOLUTION INTRAVENOUS at 03:56

## 2018-11-12 RX ADMIN — VANCOMYCIN HYDROCHLORIDE SCH MG: 250 CAPSULE ORAL at 23:56

## 2018-11-12 RX ADMIN — METRONIDAZOLE SCH MG: 500 INJECTION, SOLUTION INTRAVENOUS at 06:26

## 2018-11-12 RX ADMIN — NICOTINE SCH MG: 21 PATCH, EXTENDED RELEASE TRANSDERMAL at 09:30

## 2018-11-12 RX ADMIN — ATORVASTATIN CALCIUM SCH MG: 10 TABLET, FILM COATED ORAL at 20:26

## 2018-11-12 RX ADMIN — ASPIRIN SCH MG: 81 TABLET, COATED ORAL at 20:25

## 2018-11-12 RX ADMIN — POTASSIUM CHLORIDE SCH MEQ: 1500 TABLET, EXTENDED RELEASE ORAL at 16:35

## 2018-11-12 RX ADMIN — VANCOMYCIN HYDROCHLORIDE SCH MG: 250 CAPSULE ORAL at 12:18

## 2018-11-12 RX ADMIN — DICYCLOMINE HYDROCHLORIDE SCH MG: 20 TABLET ORAL at 20:22

## 2018-11-12 RX ADMIN — SODIUM CHLORIDE SCH GM: 9 INJECTION, SOLUTION INTRAVENOUS at 15:31

## 2018-11-12 RX ADMIN — RAMIPRIL SCH MG: 5 CAPSULE ORAL at 20:24

## 2018-11-12 RX ADMIN — Medication PRN MG: at 20:22

## 2018-11-12 RX ADMIN — Medication SCH MG: at 09:30

## 2018-11-12 RX ADMIN — SODIUM CHLORIDE PRN MG: 900 INJECTION INTRAVENOUS at 20:22

## 2018-11-13 VITALS — DIASTOLIC BLOOD PRESSURE: 61 MMHG | SYSTOLIC BLOOD PRESSURE: 122 MMHG

## 2018-11-13 VITALS — SYSTOLIC BLOOD PRESSURE: 145 MMHG | DIASTOLIC BLOOD PRESSURE: 66 MMHG

## 2018-11-13 VITALS — DIASTOLIC BLOOD PRESSURE: 58 MMHG | SYSTOLIC BLOOD PRESSURE: 118 MMHG

## 2018-11-13 VITALS — DIASTOLIC BLOOD PRESSURE: 60 MMHG | SYSTOLIC BLOOD PRESSURE: 114 MMHG

## 2018-11-13 VITALS — SYSTOLIC BLOOD PRESSURE: 118 MMHG | DIASTOLIC BLOOD PRESSURE: 58 MMHG

## 2018-11-13 VITALS — DIASTOLIC BLOOD PRESSURE: 60 MMHG | SYSTOLIC BLOOD PRESSURE: 124 MMHG

## 2018-11-13 VITALS — SYSTOLIC BLOOD PRESSURE: 132 MMHG | DIASTOLIC BLOOD PRESSURE: 59 MMHG

## 2018-11-13 RX ADMIN — METRONIDAZOLE SCH MG: 500 INJECTION, SOLUTION INTRAVENOUS at 12:29

## 2018-11-13 RX ADMIN — METRONIDAZOLE SCH MG: 500 INJECTION, SOLUTION INTRAVENOUS at 18:05

## 2018-11-13 RX ADMIN — DICYCLOMINE HYDROCHLORIDE SCH MG: 20 TABLET ORAL at 13:10

## 2018-11-13 RX ADMIN — DICYCLOMINE HYDROCHLORIDE SCH MG: 20 TABLET ORAL at 09:32

## 2018-11-13 RX ADMIN — VANCOMYCIN HYDROCHLORIDE SCH MG: 250 CAPSULE ORAL at 18:05

## 2018-11-13 RX ADMIN — CLONAZEPAM SCH MG: 1 TABLET ORAL at 20:15

## 2018-11-13 RX ADMIN — SODIUM CHLORIDE SCH GM: 9 INJECTION, SOLUTION INTRAVENOUS at 02:36

## 2018-11-13 RX ADMIN — VANCOMYCIN HYDROCHLORIDE SCH MG: 250 CAPSULE ORAL at 05:13

## 2018-11-13 RX ADMIN — VANCOMYCIN HYDROCHLORIDE SCH MG: 250 CAPSULE ORAL at 12:29

## 2018-11-13 RX ADMIN — NICOTINE SCH MG: 21 PATCH, EXTENDED RELEASE TRANSDERMAL at 09:33

## 2018-11-13 RX ADMIN — DICYCLOMINE HYDROCHLORIDE SCH MG: 20 TABLET ORAL at 18:05

## 2018-11-13 RX ADMIN — SODIUM CHLORIDE SCH GM: 9 INJECTION, SOLUTION INTRAVENOUS at 15:13

## 2018-11-13 RX ADMIN — CITALOPRAM HYDROBROMIDE SCH MG: 20 TABLET, FILM COATED ORAL at 20:15

## 2018-11-13 RX ADMIN — Medication PRN MG: at 20:14

## 2018-11-13 RX ADMIN — CITALOPRAM HYDROBROMIDE SCH MG: 20 TABLET, FILM COATED ORAL at 09:00

## 2018-11-13 RX ADMIN — Medication SCH MG: at 09:32

## 2018-11-13 RX ADMIN — METOPROLOL TARTRATE SCH MG: 25 TABLET, FILM COATED ORAL at 20:16

## 2018-11-13 RX ADMIN — ATORVASTATIN CALCIUM SCH MG: 10 TABLET, FILM COATED ORAL at 20:15

## 2018-11-13 RX ADMIN — SODIUM CHLORIDE PRN MG: 900 INJECTION INTRAVENOUS at 20:14

## 2018-11-13 RX ADMIN — RAMIPRIL SCH MG: 5 CAPSULE ORAL at 20:15

## 2018-11-13 RX ADMIN — DICYCLOMINE HYDROCHLORIDE SCH MG: 20 TABLET ORAL at 20:15

## 2018-11-13 RX ADMIN — POTASSIUM CHLORIDE SCH MEQ: 1500 TABLET, EXTENDED RELEASE ORAL at 16:37

## 2018-11-13 RX ADMIN — ASPIRIN SCH MG: 81 TABLET, COATED ORAL at 20:15

## 2018-11-13 RX ADMIN — METRONIDAZOLE SCH MG: 500 INJECTION, SOLUTION INTRAVENOUS at 05:13

## 2018-11-14 VITALS — DIASTOLIC BLOOD PRESSURE: 58 MMHG | SYSTOLIC BLOOD PRESSURE: 127 MMHG

## 2018-11-14 VITALS — DIASTOLIC BLOOD PRESSURE: 59 MMHG | SYSTOLIC BLOOD PRESSURE: 130 MMHG

## 2018-11-14 VITALS — SYSTOLIC BLOOD PRESSURE: 115 MMHG | DIASTOLIC BLOOD PRESSURE: 67 MMHG

## 2018-11-14 VITALS — SYSTOLIC BLOOD PRESSURE: 127 MMHG | DIASTOLIC BLOOD PRESSURE: 58 MMHG

## 2018-11-14 VITALS — SYSTOLIC BLOOD PRESSURE: 130 MMHG | DIASTOLIC BLOOD PRESSURE: 65 MMHG

## 2018-11-14 VITALS — DIASTOLIC BLOOD PRESSURE: 57 MMHG | SYSTOLIC BLOOD PRESSURE: 112 MMHG

## 2018-11-14 RX ADMIN — Medication SCH MG: at 08:20

## 2018-11-14 RX ADMIN — METRONIDAZOLE SCH MG: 500 INJECTION, SOLUTION INTRAVENOUS at 17:50

## 2018-11-14 RX ADMIN — METRONIDAZOLE SCH MG: 500 INJECTION, SOLUTION INTRAVENOUS at 05:08

## 2018-11-14 RX ADMIN — CITALOPRAM HYDROBROMIDE SCH MG: 20 TABLET, FILM COATED ORAL at 08:20

## 2018-11-14 RX ADMIN — DICYCLOMINE HYDROCHLORIDE SCH MG: 20 TABLET ORAL at 17:50

## 2018-11-14 RX ADMIN — POTASSIUM CHLORIDE SCH MEQ: 1500 TABLET, EXTENDED RELEASE ORAL at 17:50

## 2018-11-14 RX ADMIN — DICYCLOMINE HYDROCHLORIDE SCH MG: 20 TABLET ORAL at 08:20

## 2018-11-14 RX ADMIN — METRONIDAZOLE SCH MG: 500 INJECTION, SOLUTION INTRAVENOUS at 00:00

## 2018-11-14 RX ADMIN — SODIUM CHLORIDE SCH GM: 9 INJECTION, SOLUTION INTRAVENOUS at 14:46

## 2018-11-14 RX ADMIN — METRONIDAZOLE SCH MG: 500 INJECTION, SOLUTION INTRAVENOUS at 12:03

## 2018-11-14 RX ADMIN — VANCOMYCIN HYDROCHLORIDE SCH MG: 250 CAPSULE ORAL at 00:00

## 2018-11-14 RX ADMIN — DICYCLOMINE HYDROCHLORIDE SCH MG: 20 TABLET ORAL at 15:03

## 2018-11-14 RX ADMIN — VANCOMYCIN HYDROCHLORIDE SCH MG: 250 CAPSULE ORAL at 12:03

## 2018-11-14 RX ADMIN — VANCOMYCIN HYDROCHLORIDE SCH MG: 250 CAPSULE ORAL at 05:08

## 2018-11-14 RX ADMIN — SODIUM CHLORIDE SCH GM: 9 INJECTION, SOLUTION INTRAVENOUS at 03:00

## 2018-11-14 RX ADMIN — VANCOMYCIN HYDROCHLORIDE SCH MG: 250 CAPSULE ORAL at 17:50

## 2018-11-14 RX ADMIN — NICOTINE SCH MG: 21 PATCH, EXTENDED RELEASE TRANSDERMAL at 08:20

## 2018-12-19 NOTE — DISCHARGE SUMMARY
CHIEF COMPLAINT:  Acute C. diff, hypokalemia.



FINAL DIAGNOSES

1.  Clostridium difficile colitis.

2.  Hypertension.

3.  Major depressive disorder/anxiety.



DISPOSITION:  Home.



HOSPITAL COURSE:  A 63-year-old female with known history of hypertension, 

renal artery stenosis, brought to the ER with 1-week history of generalized 

abdominal discomfort and worsening diarrhea.  She underwent review in the 

ER.  Studies were showing evidence of C. diff colitis.  She did receive a 

course of p.o. antibiotics 1 week prior to the diarrhea for acute 

bronchitis.



With further review, she was found to have a grade 3/6 systolic murmur.  

Abdomen was revealing moderate right and left lower quadrant tenderness, 

and further management led to admission for care regarding generalized 

abdominal pain, C. diff colitis, coronary artery disease, and morbid 

obesity.  We began IV fluids.  Was started on p.o. vancomycin.  Began IV 

fluids.



Once admitted, regarding the presence of C. diff colitis, she was being 

reviewed also with GI through Dr. Manuel Carrero.  With his evaluation, his 

impression was C diff colitis, continue current care; hypokalemia, 

currently being supplemented.  Agree with care.



She was continued on a clear liquid diet.  Continues to have the loose 

stools.  She was receiving Flagyl.  Continue with the potassium 

replenishment.  On the p.o. vancomycin.  Daily medications were continued 

as well.  She was also given morphine for pain control.



Noted on November 9, 2018, that her potassium was 2.9, white cell count was 

12,700.  On November 10, 2018, she was beginning to feel better.  Noted to 

have Hemoccult-positive stools.  Urinalysis was showing Gram stain of 

gram-negative rods.  She was being started on ceftriaxone.  Continued to 

have significant diarrhea.  Bentyl was now being added.  She was continuing 

to show improvement.  She was eager to go home.  Her stools began to form 

up.  Her IV antibiotics now were being adjusted over to p.o. status.  She 

began having less and less BMs per day, tolerating her GI soft diet.  

Stools were now being rechecked for C. diff, returning negative.  She was 

being cleared for discharge.  She will be followed up further on outpatient 

basis.  She was released on November 14, 2018, in stable condition.



With discharge, she will continue on her diet as directed.  No equipment or 

supplies necessary.  No drains or Roach needed.  Activity level as directed 

by myself as well as by Dr. Manuel Carrero.



She was given a prescription for

1.  Flagyl 500 mg 1 tablet p.o. t.i.d., #21.

2.  Levaquin 500 mg 1 tablet p.o. daily, #7.

3.  Benadryl 20 mg 1 tablet p.o. q.i.d. a.c. and nightly, 120.

4.  NicoDerm patch 21 mg 1 patch q.24 h.

5.  She will also continue on aspirin 81 mg daily.

6.  Lipitor 10 mg daily.

7.  Calcium carbonate/vitamin D 1 tablet twice a day.

8.  Citalopram 40 mg twice a day.

9.  Clonazepam 2 mg at bedtime.

10.  Vitamin B12 500 mcg tablet 1 daily.

11.  Ferrous sulfate 325 mg daily.

12.  Glucosamine chondroitin 1500 mg 2 tablets daily.

13.  Metoprolol tartrate 25 mg at bedtime.

14.  Multivitamins with minerals 1 tablet daily.

15.  Ramipril 10 mg daily.

16.  Coenzyme-Q10, ten mg daily.



She has no documented PCP on her demographic sheet.  She was requested to 

follow up with me in my office within 2 weeks for evaluation.



Instructed to call if she has any further concerns or questions or she has 

any recurrence of symptoms.



Dictated By:  IGNACIO Charles









DD:  12/19/2018 00:37

DT:  12/19/2018 02:43

Job#:  [id number]

## 2018-12-21 LAB
BASOPHILS # BLD AUTO: 0 10*3/UL (ref 0–0.1)
BASOPHILS NFR BLD AUTO: 0.5 % (ref 0–1)
DEPRECATED NEUTROPHILS # BLD AUTO: 4.2 10*3/UL (ref 2.1–6.9)
EOSINOPHIL # BLD AUTO: 0.2 10*3/UL (ref 0–0.4)
EOSINOPHIL NFR BLD AUTO: 2.6 % (ref 0–6)
ERYTHROCYTE [DISTWIDTH] IN CORD BLOOD: 12.4 % (ref 11.7–14.4)
HCT VFR BLD AUTO: 36 % (ref 34.2–44.1)
HGB BLD-MCNC: 11.8 G/DL (ref 12–16)
LYMPHOCYTES # BLD: 1.6 10*3/UL (ref 1–3.2)
LYMPHOCYTES NFR BLD AUTO: 24.1 % (ref 18–39.1)
MCH RBC QN AUTO: 31.7 PG (ref 28–32)
MCHC RBC AUTO-ENTMCNC: 32.8 G/DL (ref 31–35)
MCV RBC AUTO: 96.8 FL (ref 81–99)
MONOCYTES # BLD AUTO: 0.6 10*3/UL (ref 0.2–0.8)
MONOCYTES NFR BLD AUTO: 8.7 % (ref 4.4–11.3)
NEUTS SEG NFR BLD AUTO: 63.8 % (ref 38.7–80)
PLATELET # BLD AUTO: 219 X10E3/UL (ref 140–360)
RBC # BLD AUTO: 3.72 X10E6/UL (ref 3.6–5.1)

## 2018-12-27 ENCOUNTER — HOSPITAL ENCOUNTER (OUTPATIENT)
Dept: HOSPITAL 88 - ENDO | Age: 63
Discharge: HOME | End: 2018-12-27
Attending: INTERNAL MEDICINE
Payer: COMMERCIAL

## 2018-12-27 VITALS — SYSTOLIC BLOOD PRESSURE: 130 MMHG | DIASTOLIC BLOOD PRESSURE: 47 MMHG

## 2018-12-27 DIAGNOSIS — K62.89: ICD-10-CM

## 2018-12-27 DIAGNOSIS — F32.9: ICD-10-CM

## 2018-12-27 DIAGNOSIS — I20.9: ICD-10-CM

## 2018-12-27 DIAGNOSIS — Z79.82: ICD-10-CM

## 2018-12-27 DIAGNOSIS — K20.8: ICD-10-CM

## 2018-12-27 DIAGNOSIS — Z01.812: ICD-10-CM

## 2018-12-27 DIAGNOSIS — E78.00: ICD-10-CM

## 2018-12-27 DIAGNOSIS — F41.9: ICD-10-CM

## 2018-12-27 DIAGNOSIS — K64.8: ICD-10-CM

## 2018-12-27 DIAGNOSIS — K29.60: ICD-10-CM

## 2018-12-27 DIAGNOSIS — Z01.810: ICD-10-CM

## 2018-12-27 DIAGNOSIS — K57.30: ICD-10-CM

## 2018-12-27 DIAGNOSIS — I10: ICD-10-CM

## 2018-12-27 DIAGNOSIS — F17.210: ICD-10-CM

## 2018-12-27 DIAGNOSIS — Z98.84: ICD-10-CM

## 2018-12-27 DIAGNOSIS — K52.9: Primary | ICD-10-CM

## 2018-12-27 LAB
C DIFFICILE TOXIN A&B AMP PROB: NEGATIVE
LACTOFERRIN STL QL: NEGATIVE

## 2018-12-27 PROCEDURE — 45380 COLONOSCOPY AND BIOPSY: CPT

## 2018-12-27 PROCEDURE — 83993 ASSAY FOR CALPROTECTIN FECAL: CPT

## 2018-12-27 PROCEDURE — 43239 EGD BIOPSY SINGLE/MULTIPLE: CPT

## 2018-12-27 PROCEDURE — 87177 OVA AND PARASITES SMEARS: CPT

## 2018-12-27 PROCEDURE — 83630 LACTOFERRIN FECAL (QUAL): CPT

## 2018-12-27 PROCEDURE — 87045 FECES CULTURE AEROBIC BACT: CPT

## 2018-12-27 PROCEDURE — 87493 C DIFF AMPLIFIED PROBE: CPT

## 2018-12-27 PROCEDURE — 85025 COMPLETE CBC W/AUTO DIFF WBC: CPT

## 2018-12-27 PROCEDURE — 93005 ELECTROCARDIOGRAM TRACING: CPT

## 2018-12-27 PROCEDURE — 87328 CRYPTOSPORIDIUM AG IA: CPT

## 2018-12-27 PROCEDURE — 36415 COLL VENOUS BLD VENIPUNCTURE: CPT

## 2018-12-27 NOTE — OPERATIVE REPORT
DATE OF PROCEDURE:  December 27, 2018 



REFERRING PHYSICIAN:  Dr. Shane Warner. 



PROCEDURES PERFORMED:  

1. Esophagogastroduodenoscopy with biopsies.  

2. Colonoscopy with biopsies.



INDICATIONS FOR ESOPHAGOGASTRODUODENOSCOPY:  Upper abdominal pain, nausea, 

bloating.



INDICATIONS FOR COLONOSCOPY:  Surveillance colonoscopy, personal history of 

colon polyps, lower abdominal pain, diarrhea.



MEDICATION:  Patient was done under MAC.  Please see anesthesiologist's 

note.



PROCEDURE:  With the patient in the left lateral decubitus position, the 

flexible fiberoptic Olympus gastroscope was introduced into the esophagus 

under direct visualization without any difficulty.  There was some patchy 

erythema noted in the distal esophagus.  The scope was then advanced with 

ease into the stomach, and patient appears to be status post Nidia-en-Y.  

The gastric stump revealed some patchy intense erythema and low-grade to 

moderate edema, and biopsies were obtained.  The enteric loop was patent.  

The scope was then retroflexed into the gastric stump, and mucosa overlying 

the fundus and the cardia appeared to be within normal limits.  The scope 

was then straightened out.  It was subsequently withdrawn.  Patient 

tolerated the procedure well.



IMPRESSION:  

1. Distal esophagitis, mild.

2. Stump gastritis, biopsied.  Biopsies sent to stain for H. pylori.

3. Status post Nidia-en-Y, anastomosis intact.



PLAN:  Follow up histology.  Initiate Protonix 40 mg 1 p.o. q.a.m. a.c.



Patient was then turned around and after adequate lubrication of the anal 

canal, a flexible fiberoptic Olympus colonoscope was inserted into the 

rectum with ease and advanced all the way to the cecum.  The ileocecal 

valve appeared to be within normal limits, and that was intubated and the 

scope was advanced into the terminal ileum.  Biopsies were obtained.  The 

scope was then withdrawn back into the colon.  It was then withdrawn 

slowly, and the mucosa overlying the ascending, transverse, descending, 

sigmoid and rectum revealed some mild patchy inflammatory changes, and 

multiple random biopsies were obtained.  The scope was then retroflexed 

into the distal rectum and small internal hemorrhoids were noted, none of 

which was actively bleeding.  The scope was then straightened out.  It was 

subsequently withdrawn after securing an adequate stool specimen that was 

sent for the appropriate stool studies.  Patient tolerated the procedure 

well.  



IMPRESSION:   

1. Patchy mild colitis.

2. Proctitis, mild.

3. Internal hemorrhoids, none actively bleeding.  



PLAN:   Follow up histology.  Follow up stool studies.  Initiate Bentyl 10 

mg 1 p.o. t.i.d.  Patient might benefit from a followup colonoscopy in 3 to 

5 years.  









DD:  12/27/2018 10:11

DT:  12/27/2018 13:00

Job#:  [id number]

cc:SHANE WARNER MD

## 2018-12-27 NOTE — XMS REPORT
Clinical Summary

                             Created on: 2018



Yoni Ramón CEDEÑO

External Reference #: [id number]

: 1955

Sex: Female



Demographics







                          Address                   [address] 

[place]  [address]

 

                          Home Phone                [phone]

 

                          Preferred Language        English

 

                          Marital Status            

 

                          Tenriism Affiliation     [id number]

 

                          Race                      White

 

                          Ethnic Group              Non-





Author







                          Author                    [facility]

 

                          Organization              [facility]

 

                          Address                   Unknown

 

                          Phone                     Unavailable







Support







                Name            Relationship    Address         Phone

 

                Huseyin Vallejo    [employer]            Unknown         [phone]







Care Team Providers







                    Care Team Member Name    Role                Phone

 

                    Beverly Fuentes DO    PCP                 [phone]







Allergies

Not on File



Medications

Not on file



Active Problems





Not on file



Encounters







                          Care Team                 Description



                     Date                Type                Specialty  

 

                                        



Beverly Fuentes,                 Transient arthropathy, pelvic region and thigh, right



                     2018          Hospital            Radiology  



                                         Encounter   

 

                                        



Beverly Fuentes DO                Low back pain, unspecified back pain laterality, unspecified

 chronicity, with sciatica presence unspecified



                     2018          Hospital            Radiology  



                                         Encounter   

 

                                        



Beverly Fuentes DO                Postmenopausal status (age-related) (natural)



                     2018          Hospital            Radiology  



                                         Encounter   

 

                                        



Beverly Fuentes,                 Low back pain, unspecified back pain laterality, unspecified

 chronicity, with sciatica presence unspecified (Primary Dx); 

Transient arthropathy, pelvic region and thigh, right



                     2018          Transcribe          Radiology  



                                         Orders   

 

                                        



Beverly Fuentes DO                Postmenopausal status (age-related) (natural) (Primary 

Dx)



                     2018          Transcribe          Access  



                                         Orders   

 

                                        



Beverly Fuentes DO                Screening breast examination



                     2018          Hospital            Radiology  



                                         Encounter   



after 2017



Social History







                                        Date



                 Tobacco Use     Types           Packs/Day       Years Used 

 

                                         



                                         Never Assessed    









 



                           Sex Assigned at Birth     Date Recorded

 

 



                                         Not on file 









                                        Industry



                           Job Start Date            Occupation 

 

                                        Not on file



                           Not on file               Not on file 









                                        Travel End



                           Travel History            Travel Start 

 





                                         No recent travel history available.







Last Filed Vital Signs

Not on file



Plan of Treatment







   



                 Health Maintenance     Due Date        Last Done       Comments

 

   



                           CERVICAL CANCER SCREENING     1974  

 

   



                           COLON CANCER SCREENING     2003  

 

   



                           SHINGLES VACCINES (2003  



                                         2)   

 

   



                           PNEUMOCOCCAL              2018  



                                         POLYSACCHARIDE VACCINE   



                                         AGE 65 AND OVER   

 

   



                           PNEUMOCOCCAL-13           2018  

 

   



                           INFLUENZA VACCINE         2018  

 

   



                     BREAST CANCER SCREENING     2020 







Procedures







                                        Comments



                 Procedure Name     Priority        Date/Time       Associated Diagnosis 

 

                                        



Results for this procedure are in the results section.



                 XR HIP 4 VIEWS RIGHT     Routine         2018      Transient arthropathy, 



                           12:26 PM CDT              pelvic region and thigh, 



                                         right 

 

                                        



Results for this procedure are in the results section.



                 XR LUMBAR SPINE COMPLETE     Routine         2018      Low back pain, 



                     4+ VW               12:25 PM CDT        unspecified back pain 



                                         laterality, unspecified 



                                         chronicity, with sciatica 



                                         presence unspecified 

 

                                        



Results for this procedure are in the results section.



                 BONE DENSITY     Routine         2018      Postmenopausal status 



                           11:42 AM CDT              (age-related) (natural) 

 

                                        



Results for this procedure are in the results section.



                 MAMMO SCREENING W CAD     Routine         2018      Screening breast 



                     BILATERAL           10:44 AM CST        examination 



after 2017



Results

* XR Hip 4 Views Right (2018 12:26 PM CDT)





 



                           Narrative                 Performed At

 

 



                           Title:Right hip           [facility]



                                         Reason for exam:M12.851 Other specific arthropathiesnot elsewhere 



                                         classifiedright hip, ARTHRITISHIP 



                                          



                                         Comparison studies: 



                                         CT right hip 



                                         Impression: 



                                         There is a total right hip prosthesis in good alignment. There is no breakage or

 



                                         displacement of the hardware. 



                                         The sacroiliac joints are unremarkable. The iliac, pubic and ischial bones are 





                                         unremarkable. The left hip is incidentally unremarkable. 



                                         [id number] 









                                        Procedure Note

 

                                        



Hm Interface, Radiology Results Incoming - 2018  2:14 PM CDT



Title:Right hip



Reason for exam:M12.851 Other specific arthropathies  not elsewhere classified  
right hip, ARTHRITIS  HIP

   



Comparison studies:



CT right hip



Impression:



There is a total right hip prosthesis in good alignment. There is no breakage or
displacement of the hardware.



The sacroiliac joints are unremarkable. The iliac, pubic and ischial bones are 
unremarkable. The left hip is incidentally unremarkable.







[id number]











   



                 Performing Organization     Address         City/State/Zipcode     Phone Number

 

   



                      [facility]          [address] [place] [address] 





* XR Lumbar Spine Complete 4+ Vw (2018 12:25 PM CDT)





 



                           Narrative                 Performed At

 

 



                           EXAMINATION:XR LUMBAR SPINE COMPLETE 4VW     [facility]



                                         CLINICAL HISTORY:M54.5 Low back pain, LOW BACK PAINUNCOMPLICATEDNO 





                                         RED FLAG SIGNS SYMPTOMS HISTORY 



                                         COMPARISON:None. 



                                         IMPRESSION: 



                                         There is mild congenital lumbar spinal canal stenosis with decrease in the 



                                         transverse diameter of the spinal canal. 



                                         There is mild diffuse disc space narrowing throughout the lumbar spine. 



                                         There is grade 1 degenerative anterolisthesis at L4-5 with prominent underlying

 



                                         facet joint degenerative changes especially on the right. 



                                         There is mild levo rotoscoliosis. 



                                         There is no evidence of compression fracture. 



                                         [facility]-[id number] 









                                        Procedure Note

 

                                        



Hm Interface, Radiology Results Incoming - 2018  2:26 PM CDT



EXAMINATION:  XR LUMBAR SPINE COMPLETE 4  VW



CLINICAL HISTORY:  M54.5 Low back pain, LOW BACK PAIN  UNCOMPLICATED  NO RED 
FLAG SIGNS SYMPTOMS HISTORY



COMPARISON:  None.





IMPRESSION:

 



There is mild congenital lumbar spinal canal stenosis with decrease in the 
transverse diameter of the spinal canal.



There is mild diffuse disc space narrowing throughout the lumbar spine.



There is grade 1 degenerative anterolisthesis at L4-5 with prominent underlying 
facet joint degenerative changes especially on the right.



There is mild levo rotoscoliosis.



There is no evidence of compression fracture.



 



 





[facility]-[id number]











   



                 Performing Organization     Address         City/State/Zipcode     Phone Number

 

   



                     [facility]          [address] [place] [address] 





* Bone Density (2018 11:42 AM CDT)





 



                           Narrative                 Performed At

 

 



                           [facility]IN[facility] DENSITY      [facility]



                                         CLINICAL HISTORY:Z78.0 Asymptomatic menopausal state, postmenopausal status

 



                                         COMPARISON:None. 



                                         The results of this study expressed as bone mineral density (BMD) were as 



                                         follows: 



                                         AP spine (L1-L4) 



                                         BMD: 1.45 g/cm2 



                                         T-Score: 2.1 



                                         Percent change from previous:NA 



                                         Dual Femur (Total Mean): 



                                         BMD: 0.92 g/cm2 



                                         T-Score: -0.7 



                                         Percent change from previous:NA 



                                         Forearm (Radius 33%): 



                                         BMD: NA g/cm2 



                                         T-Score: NA 



                                         Percent change from previous:NA 



                                         Dual femur FRAX: 



                                         Risk factors: Current tobacco use 



                                         10 year probability of fracture: 



                                         1.Major osteoporotic: 7.9% 



                                         2.Hip: 1.3% 



                                         Impression: 



                                         Bone mineral density values as above. No osteoporosis or osteopenia. 



                                         A copy of this scans including a report detailing these results will follow. 



                                         Note: The world health organization (WHO) has classified the patient's T-score 





                                         as follows: 



                                         Normal:T score at or above -1.0 



                                         Osteopenia:T score between -1.0 and -2.5 



                                         Osteoporosis:T score at or below -2.5 (osteoporosis, increased fracture 



                                         risk) 



                                         [facility]-[id number] 









                                        Procedure Note

 

                                        



 Interface, Radiology Results Incoming - 2018  3:41 PM CDT



EXAMINATION:  BONE DENSITY



CLINICAL HISTORY:  Z78.0 Asymptomatic menopausal state, postmenopausal status



COMPARISON:  None.



The results of this study expressed as bone mineral density (BMD) were as 
follows:



AP spine (L1-L4)

BMD: 1.45 g/cm2

T-Score: 2.1

Percent change from previous:  NA 





Dual Femur (Total Mean):

BMD: 0.92 g/cm2

T-Score: -0.7

Percent change from previous:    NA





Forearm (Radius 33%):

BMD: NA g/cm2

T-Score: NA

Percent change from previous:  NA





Dual femur FRAX:

Risk factors: Current tobacco use

                                        10 year probability of fracture:

                                        1.  Major osteoporotic: 7.9%

                                        2.  Hip: 1.3%







Impression:  

Bone mineral density values as above. No osteoporosis or osteopenia.







A copy of this scans including a report detailing these results will follow.



Note: The world health organization (WHO) has classified the patient's T-score 
as follows:



Normal:  T score at or above -1.0

Osteopenia:  T score between -1.0 and -2.5

Osteoporosis:  T score at or below -2.5 (osteoporosis, increased fracture risk)



[facility]-[id number]











   



                 Performing Organization     Address         City/State/Zipcode     Phone Number

 

   



                      [facility]          [address] [place] [address] 





* Mammo Screening w Cad Bilateral (2018 10:44 AM CST)





 



                           Narrative                 Performed At

 

 



                           PROCEDURE: MAMMO SCREENING W CAD BILATERAL      [facility]



                                         Computer-assisted detection was utilized inthe interpretation of this exam.

 



                                         COMPARISON: No prior outside facility mammograms from [facility] 



                                         TECHNIQUE: 



                                          



                                         Bilateral digital screening mammogram was performed and interpreted using 



                                         computer-assisted detection. 



                                         HISTORY: Asymptomatic routine screening. 



                                         Family History: No known family history. 



                                         FINDINGS: 



                                         BreastComposition: There are scattered areas of fibroglandular density ( 



                                         category B). 



                                         No suspicious mass , architectural distortion or suspicious microcalcifications

 



                                         are present. 



                                         There are scattered bilateral benign morphology breast calcifications. 



                                         IMPRESSION: No mammographic evidence of malignancy. 



                                         Birads Category 2. Benign. 



                                         RECOMMENDATIONS:If the clinical breast examination is unchanged and normal 

, 



                                         annual screening mammography is recommended per ACS and ACR guidelines. 



                                         PATIENT INFORMATION HAS BEEN ENTERED INTO A REMINDER SYSTEM WITH TARGET DUE DATE

 



                                         FOR THE NEXT MAMMOGRAM. 



                                         NOTE: 



                                         This facility is a designated [facility] ( [facility]) 

, 



                                         meeting standards of accreditation in all modalities of breast imaging. 



                                         This facility is accredited by The American College of Radiology for 



                                         Mammography. 



                                         A negative x-ray report should not delay biopsy if a dominant or clinically 



                                         suspicious mass is present. Not all cancers are identified by x-ray. 



                                         [id number] 









   



                 Performing Organization     Address         City/State/Zipcode     Phone Number

 

   



                      [facility]          [address] [place] [address] 





after 2017



Insurance







     



            Payer      Benefit     Subscriber ID     Type       Phone      Address



                                         Plan /    



                                         Group    

 

     



                 BCBS            BCBS            xxxxxxxxxxxx     PPO  



                                         CHOICE    



                                         PPO/ROHINI HINES PPO    









     



            Guarantor Name     Account     Relation to     Date of     Phone      Billing Address



                     Type                Patient             Birth  

 

     



            Ramón Vallejo     Personal/F     Self       1955     [phone]     [phone]



                     [facility]               (Home)              [place] [address]







Advance Directives





Patient has advance care planning documents on file. For more information, shiraz duque contact:



Doc Su



[address] [place] [address]

## 2022-10-03 LAB
BASOPHILS # BLD AUTO: 0 10*3/UL (ref 0–0.1)
BASOPHILS NFR BLD AUTO: 0.4 % (ref 0–1)
DEPRECATED NEUTROPHILS # BLD AUTO: 4.6 10*3/UL (ref 2.1–6.9)
EOSINOPHIL # BLD AUTO: 0.2 10*3/UL (ref 0–0.4)
EOSINOPHIL NFR BLD AUTO: 2.1 % (ref 0–6)
ERYTHROCYTE [DISTWIDTH] IN CORD BLOOD: 11.6 % (ref 11.7–14.4)
HCT VFR BLD AUTO: 41.7 % (ref 34.2–44.1)
HGB BLD-MCNC: 13.5 G/DL (ref 12–16)
LYMPHOCYTES # BLD: 1.9 10*3/UL (ref 1–3.2)
LYMPHOCYTES NFR BLD AUTO: 25.7 % (ref 18–39.1)
MCH RBC QN AUTO: 30.6 PG (ref 28–32)
MCHC RBC AUTO-ENTMCNC: 32.4 G/DL (ref 31–35)
MCV RBC AUTO: 94.6 FL (ref 81–99)
MONOCYTES # BLD AUTO: 0.6 10*3/UL (ref 0.2–0.8)
MONOCYTES NFR BLD AUTO: 8.2 % (ref 4.4–11.3)
NEUTS SEG NFR BLD AUTO: 63.3 % (ref 38.7–80)
PLATELET # BLD AUTO: 244 X10E3/UL (ref 140–360)
RBC # BLD AUTO: 4.41 X10E6/UL (ref 3.6–5.1)

## 2022-10-04 ENCOUNTER — HOSPITAL ENCOUNTER (OUTPATIENT)
Dept: HOSPITAL 88 - OR | Age: 67
Discharge: HOME | End: 2022-10-04
Attending: INTERNAL MEDICINE
Payer: COMMERCIAL

## 2022-10-04 VITALS — SYSTOLIC BLOOD PRESSURE: 117 MMHG | DIASTOLIC BLOOD PRESSURE: 69 MMHG

## 2022-10-04 DIAGNOSIS — F41.9: ICD-10-CM

## 2022-10-04 DIAGNOSIS — F32.A: ICD-10-CM

## 2022-10-04 DIAGNOSIS — Z01.812: ICD-10-CM

## 2022-10-04 DIAGNOSIS — Z98.84: ICD-10-CM

## 2022-10-04 DIAGNOSIS — I10: ICD-10-CM

## 2022-10-04 DIAGNOSIS — K64.8: ICD-10-CM

## 2022-10-04 DIAGNOSIS — K20.90: ICD-10-CM

## 2022-10-04 DIAGNOSIS — K59.00: ICD-10-CM

## 2022-10-04 DIAGNOSIS — D12.3: ICD-10-CM

## 2022-10-04 DIAGNOSIS — K21.9: ICD-10-CM

## 2022-10-04 DIAGNOSIS — E78.5: ICD-10-CM

## 2022-10-04 DIAGNOSIS — Z79.899: ICD-10-CM

## 2022-10-04 DIAGNOSIS — K29.70: Primary | ICD-10-CM

## 2022-10-04 DIAGNOSIS — Z01.810: ICD-10-CM

## 2022-10-04 PROCEDURE — 93005 ELECTROCARDIOGRAM TRACING: CPT

## 2022-10-04 PROCEDURE — 36415 COLL VENOUS BLD VENIPUNCTURE: CPT

## 2022-10-04 PROCEDURE — 43239 EGD BIOPSY SINGLE/MULTIPLE: CPT

## 2022-10-04 PROCEDURE — 85025 COMPLETE CBC W/AUTO DIFF WBC: CPT

## 2022-10-04 PROCEDURE — 45385 COLONOSCOPY W/LESION REMOVAL: CPT
